# Patient Record
Sex: FEMALE | Race: WHITE | NOT HISPANIC OR LATINO | Employment: FULL TIME | URBAN - METROPOLITAN AREA
[De-identification: names, ages, dates, MRNs, and addresses within clinical notes are randomized per-mention and may not be internally consistent; named-entity substitution may affect disease eponyms.]

---

## 2023-03-15 LAB
ALBUMIN (G/DL) IN SER/PLAS: 4.5 G/DL (ref 3.4–5)
ANION GAP IN SER/PLAS: 12 MMOL/L (ref 10–20)
APPEARANCE, URINE: CLEAR
BASOPHILS (10*3/UL) IN BLOOD BY AUTOMATED COUNT: 0.04 X10E9/L (ref 0–0.1)
BASOPHILS/100 LEUKOCYTES IN BLOOD BY AUTOMATED COUNT: 0.9 % (ref 0–2)
BILIRUBIN, URINE: NEGATIVE
BLOOD, URINE: NEGATIVE
CALCIUM (MG/DL) IN SER/PLAS: 9.7 MG/DL (ref 8.6–10.6)
CARBON DIOXIDE, TOTAL (MMOL/L) IN SER/PLAS: 30 MMOL/L (ref 21–32)
CHLORIDE (MMOL/L) IN SER/PLAS: 103 MMOL/L (ref 98–107)
COLOR, URINE: ABNORMAL
CREATININE (MG/DL) IN SER/PLAS: 0.61 MG/DL (ref 0.5–1.05)
EOSINOPHILS (10*3/UL) IN BLOOD BY AUTOMATED COUNT: 0.14 X10E9/L (ref 0–0.7)
EOSINOPHILS/100 LEUKOCYTES IN BLOOD BY AUTOMATED COUNT: 3 % (ref 0–6)
ERYTHROCYTE DISTRIBUTION WIDTH (RATIO) BY AUTOMATED COUNT: 12.5 % (ref 11.5–14.5)
ERYTHROCYTE MEAN CORPUSCULAR HEMOGLOBIN CONCENTRATION (G/DL) BY AUTOMATED: 32.7 G/DL (ref 32–36)
ERYTHROCYTE MEAN CORPUSCULAR VOLUME (FL) BY AUTOMATED COUNT: 89 FL (ref 80–100)
ERYTHROCYTES (10*6/UL) IN BLOOD BY AUTOMATED COUNT: 4.26 X10E12/L (ref 4–5.2)
GFR FEMALE: >90 ML/MIN/1.73M2
GLUCOSE (MG/DL) IN SER/PLAS: 87 MG/DL (ref 74–99)
GLUCOSE, URINE: NEGATIVE MG/DL
HEMATOCRIT (%) IN BLOOD BY AUTOMATED COUNT: 37.9 % (ref 36–46)
HEMOGLOBIN (G/DL) IN BLOOD: 12.4 G/DL (ref 12–16)
IMMATURE GRANULOCYTES/100 LEUKOCYTES IN BLOOD BY AUTOMATED COUNT: 0.2 % (ref 0–0.9)
KETONES, URINE: NEGATIVE MG/DL
LEUKOCYTE ESTERASE, URINE: ABNORMAL
LEUKOCYTES (10*3/UL) IN BLOOD BY AUTOMATED COUNT: 4.6 X10E9/L (ref 4.4–11.3)
LIPASE (U/L) IN SER/PLAS: 22 U/L (ref 9–82)
LYMPHOCYTES (10*3/UL) IN BLOOD BY AUTOMATED COUNT: 2.08 X10E9/L (ref 1.2–4.8)
LYMPHOCYTES/100 LEUKOCYTES IN BLOOD BY AUTOMATED COUNT: 45.1 % (ref 13–44)
MONOCYTES (10*3/UL) IN BLOOD BY AUTOMATED COUNT: 0.45 X10E9/L (ref 0.1–1)
MONOCYTES/100 LEUKOCYTES IN BLOOD BY AUTOMATED COUNT: 9.8 % (ref 2–10)
NEUTROPHILS (10*3/UL) IN BLOOD BY AUTOMATED COUNT: 1.89 X10E9/L (ref 1.2–7.7)
NEUTROPHILS/100 LEUKOCYTES IN BLOOD BY AUTOMATED COUNT: 41 % (ref 40–80)
NITRITE, URINE: NEGATIVE
NRBC (PER 100 WBCS) BY AUTOMATED COUNT: 0 /100 WBC (ref 0–0)
PH, URINE: 8 (ref 5–8)
PHOSPHATE (MG/DL) IN SER/PLAS: 3.2 MG/DL (ref 2.5–4.9)
PLATELETS (10*3/UL) IN BLOOD AUTOMATED COUNT: 275 X10E9/L (ref 150–450)
POTASSIUM (MMOL/L) IN SER/PLAS: 3.4 MMOL/L (ref 3.5–5.3)
PROTEIN, URINE: NEGATIVE MG/DL
SODIUM (MMOL/L) IN SER/PLAS: 142 MMOL/L (ref 136–145)
SPECIFIC GRAVITY, URINE: 1.01 (ref 1–1.03)
UREA NITROGEN (MG/DL) IN SER/PLAS: 14 MG/DL (ref 6–23)
UROBILINOGEN, URINE: <2 MG/DL (ref 0–1.9)

## 2023-03-16 PROBLEM — I10 BENIGN ESSENTIAL HYPERTENSION: Status: ACTIVE | Noted: 2023-03-16

## 2023-03-16 PROBLEM — H52.00 HYPEROPIA: Status: ACTIVE | Noted: 2023-01-31

## 2023-03-16 PROBLEM — M19.079 1ST MTP ARTHRITIS: Status: ACTIVE | Noted: 2023-03-16
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
## 2023-03-16 PROBLEM — H52.209 ASTIGMATISM: Status: ACTIVE | Noted: 2023-01-31

## 2023-03-16 PROBLEM — E03.9 HYPOTHYROIDISM: Status: ACTIVE | Noted: 2023-03-16

## 2023-03-16 PROBLEM — M17.12 PRIMARY LOCALIZED OSTEOARTHROSIS OF LEFT LOWER LEG: Status: ACTIVE | Noted: 2023-03-16

## 2023-03-16 PROBLEM — H52.4 MYOPIA WITH PRESBYOPIA: Status: ACTIVE | Noted: 2023-01-31

## 2023-03-16 PROBLEM — H04.123 DRY EYES, BILATERAL: Status: ACTIVE | Noted: 2023-03-16

## 2023-03-16 PROBLEM — H52.4 MYOPIA WITH PRESBYOPIA: Status: ACTIVE | Noted: 2023-03-16

## 2023-03-16 PROBLEM — S93.491A SPRAIN OF ANTERIOR TALOFIBULAR LIGAMENT OF RIGHT ANKLE: Status: ACTIVE | Noted: 2023-03-16

## 2023-03-16 PROBLEM — M25.562 LEFT KNEE PAIN: Status: ACTIVE | Noted: 2023-03-16

## 2023-03-16 PROBLEM — R23.2 HOT FLASHES: Status: ACTIVE | Noted: 2023-03-16

## 2023-03-16 PROBLEM — H52.10 MYOPIA WITH PRESBYOPIA: Status: ACTIVE | Noted: 2023-01-31

## 2023-03-16 PROBLEM — M25.519 SHOULDER PAIN: Status: ACTIVE | Noted: 2023-03-16

## 2023-03-16 PROBLEM — S49.91XA INJURY OF RIGHT SHOULDER: Status: ACTIVE | Noted: 2023-03-16

## 2023-03-16 PROBLEM — S59.911A INJURY OF RIGHT FOREARM: Status: ACTIVE | Noted: 2023-03-16

## 2023-03-16 PROBLEM — E78.5 HYPERLIPIDEMIA: Status: ACTIVE | Noted: 2023-03-16

## 2023-03-16 PROBLEM — M79.672 LEFT FOOT PAIN: Status: ACTIVE | Noted: 2023-03-16

## 2023-03-16 PROBLEM — F32.A DEPRESSION: Status: ACTIVE | Noted: 2023-03-16

## 2023-03-16 PROBLEM — N20.0 KIDNEY STONES: Status: ACTIVE | Noted: 2023-03-16

## 2023-03-16 PROBLEM — H52.209 ASTIGMATISM: Status: ACTIVE | Noted: 2023-03-16

## 2023-03-16 PROBLEM — H52.10 MYOPIA WITH PRESBYOPIA: Status: ACTIVE | Noted: 2023-03-16

## 2023-03-16 PROBLEM — H25.813 COMBINED FORM OF AGE-RELATED CATARACT, BOTH EYES: Status: ACTIVE | Noted: 2023-01-31

## 2023-03-16 PROBLEM — M54.16 LUMBAR RADICULOPATHY: Status: ACTIVE | Noted: 2023-03-16

## 2023-03-16 PROBLEM — M25.571 RIGHT ANKLE PAIN: Status: ACTIVE | Noted: 2023-03-16

## 2023-03-16 PROBLEM — E66.3 OVERWEIGHT WITH BODY MASS INDEX (BMI) OF 29 TO 29.9 IN ADULT: Status: ACTIVE | Noted: 2023-03-16

## 2023-03-16 PROBLEM — S61.451A CAT BITE OF RIGHT HAND: Status: ACTIVE | Noted: 2023-03-16

## 2023-03-16 PROBLEM — H25.813 COMBINED FORM OF AGE-RELATED CATARACT, BOTH EYES: Status: ACTIVE | Noted: 2023-03-16

## 2023-03-16 PROBLEM — H04.123 DRY EYES, BILATERAL: Status: ACTIVE | Noted: 2023-01-31

## 2023-03-16 PROBLEM — R10.9 ABDOMINAL PAIN: Status: ACTIVE | Noted: 2023-03-16

## 2023-03-16 PROBLEM — H52.00 HYPEROPIA: Status: ACTIVE | Noted: 2023-03-16

## 2023-03-16 PROBLEM — N95.1 CLIMACTERIC: Status: ACTIVE | Noted: 2023-03-16

## 2023-03-16 PROBLEM — M79.10 MUSCLE ACHE: Status: ACTIVE | Noted: 2023-03-16

## 2023-03-16 PROBLEM — S50.11XA CONTUSION OF RIGHT FOREARM: Status: ACTIVE | Noted: 2023-03-16

## 2023-03-16 LAB
BACTERIA, URINE: ABNORMAL /HPF
RBC, URINE: ABNORMAL /HPF (ref 0–5)
SQUAMOUS EPITHELIAL CELLS, URINE: <1 /HPF
WBC, URINE: 1 /HPF (ref 0–5)

## 2023-03-16 RX ORDER — LEVOTHYROXINE SODIUM 112 UG/1
1 TABLET ORAL DAILY
COMMUNITY
Start: 2020-10-23 | End: 2023-09-11 | Stop reason: SDUPTHER

## 2023-03-16 RX ORDER — ACETAMINOPHEN 160 MG/5ML
1 SUSPENSION, ORAL (FINAL DOSE FORM) ORAL DAILY
COMMUNITY
Start: 2022-10-28 | End: 2024-03-21 | Stop reason: WASHOUT

## 2023-03-16 RX ORDER — HYDROCHLOROTHIAZIDE 12.5 MG/1
1 CAPSULE ORAL DAILY
COMMUNITY
Start: 2021-04-20 | End: 2024-01-11 | Stop reason: SDUPTHER

## 2023-03-16 RX ORDER — CITALOPRAM 20 MG/1
1 TABLET, FILM COATED ORAL DAILY
COMMUNITY
Start: 2020-10-14 | End: 2023-07-05 | Stop reason: DRUGHIGH

## 2023-03-16 RX ORDER — ATORVASTATIN CALCIUM 20 MG/1
1 TABLET, FILM COATED ORAL DAILY
COMMUNITY
Start: 2022-04-20 | End: 2023-05-30 | Stop reason: SDUPTHER

## 2023-03-20 ENCOUNTER — OFFICE VISIT (OUTPATIENT)
Dept: PRIMARY CARE | Facility: CLINIC | Age: 63
End: 2023-03-20
Payer: COMMERCIAL

## 2023-03-20 VITALS
RESPIRATION RATE: 18 BRPM | WEIGHT: 167.8 LBS | BODY MASS INDEX: 29.72 KG/M2 | OXYGEN SATURATION: 97 % | HEART RATE: 69 BPM

## 2023-03-20 DIAGNOSIS — R09.1 PLEURISY: Primary | ICD-10-CM

## 2023-03-20 PROCEDURE — 99214 OFFICE O/P EST MOD 30 MIN: CPT | Performed by: INTERNAL MEDICINE

## 2023-03-20 RX ORDER — METHYLPREDNISOLONE 4 MG/1
TABLET ORAL
Qty: 21 TABLET | Refills: 0 | Status: SHIPPED | OUTPATIENT
Start: 2023-03-20 | End: 2023-03-27

## 2023-03-20 ASSESSMENT — ENCOUNTER SYMPTOMS
DEPRESSION: 0
OCCASIONAL FEELINGS OF UNSTEADINESS: 0
LOSS OF SENSATION IN FEET: 0

## 2023-03-20 ASSESSMENT — PAIN SCALES - GENERAL: PAINLEVEL: 4

## 2023-04-15 NOTE — PROGRESS NOTES
Subjective   Patient ID: Mary Kate Short is a 62 y.o. female who presents for Pain (Left side ) and Dizziness.    HPI     Review of Systems    + Dizziness    Chest pain- ? pleuritic      No Fever/chills/headaches/ shortness of breath/palpitations/Nausea/vomiting/diarrhea/ constipation/urine frequency/blood in urine.      Objective   Pulse 69   Resp 18   Wt 76.1 kg (167 lb 12.8 oz)   SpO2 97%   BMI 29.72 kg/m²     Physical Exam    No JVP elevation. No palpable Lymph Nodes. No Thyromegaly    CVS-NL S1/S2 . No MRG    Lungs-CTA. B/S= B/L    Abdomen-Soft, Non-tender. No masses or HSM    Extremities: No C/C/E      Assessment/Plan       Dizziness    Pleuritic chest pain- ? Viral    Plan:    Fluids    NSAIDs PRN    Follow up PRN if symptoms persist or worsen

## 2023-05-30 DIAGNOSIS — F32.A DEPRESSION, UNSPECIFIED DEPRESSION TYPE: ICD-10-CM

## 2023-05-30 DIAGNOSIS — E03.9 HYPOTHYROIDISM, UNSPECIFIED TYPE: ICD-10-CM

## 2023-05-30 RX ORDER — LORAZEPAM 0.5 MG/1
0.5 TABLET ORAL 2 TIMES DAILY PRN
COMMUNITY
Start: 2023-03-20 | End: 2023-06-06 | Stop reason: SDUPTHER

## 2023-05-30 RX ORDER — LORAZEPAM 0.5 MG/1
0.5 TABLET ORAL 2 TIMES DAILY PRN
Qty: 60 TABLET | Refills: 2 | Status: CANCELLED | OUTPATIENT
Start: 2023-05-30 | End: 2023-08-28

## 2023-05-30 RX ORDER — ATORVASTATIN CALCIUM 20 MG/1
20 TABLET, FILM COATED ORAL DAILY
Qty: 30 TABLET | Refills: 0 | Status: SHIPPED | OUTPATIENT
Start: 2023-05-30 | End: 2023-07-12

## 2023-06-06 RX ORDER — LORAZEPAM 0.5 MG/1
0.5 TABLET ORAL 2 TIMES DAILY PRN
Qty: 60 TABLET | Refills: 0 | Status: SHIPPED | OUTPATIENT
Start: 2023-06-06 | End: 2024-02-02 | Stop reason: SDUPTHER

## 2023-07-01 DIAGNOSIS — E03.9 HYPOTHYROIDISM, UNSPECIFIED TYPE: ICD-10-CM

## 2023-07-05 ENCOUNTER — OFFICE VISIT (OUTPATIENT)
Dept: PRIMARY CARE | Facility: CLINIC | Age: 63
End: 2023-07-05
Payer: COMMERCIAL

## 2023-07-05 VITALS
WEIGHT: 165.6 LBS | SYSTOLIC BLOOD PRESSURE: 116 MMHG | DIASTOLIC BLOOD PRESSURE: 73 MMHG | RESPIRATION RATE: 18 BRPM | OXYGEN SATURATION: 98 % | HEART RATE: 65 BPM | BODY MASS INDEX: 29.33 KG/M2

## 2023-07-05 DIAGNOSIS — Z79.899 CONTROLLED SUBSTANCE AGREEMENT SIGNED: ICD-10-CM
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
## 2023-07-05 DIAGNOSIS — E03.9 HYPOTHYROIDISM, UNSPECIFIED TYPE: ICD-10-CM

## 2023-07-05 DIAGNOSIS — F41.9 ANXIETY: Primary | ICD-10-CM

## 2023-07-05 LAB
AMPHETAMINE (PRESENCE) IN URINE BY SCREEN METHOD: NORMAL
BARBITURATES PRESENCE IN URINE BY SCREEN METHOD: NORMAL
BENZODIAZEPINE (PRESENCE) IN URINE BY SCREEN METHOD: NORMAL
CANNABINOIDS IN URINE BY SCREEN METHOD: NORMAL
COCAINE (PRESENCE) IN URINE BY SCREEN METHOD: NORMAL
DRUG SCREEN COMMENT URINE: NORMAL
FENTANYL URINE: NORMAL
METHADONE (PRESENCE) IN URINE BY SCREEN METHOD: NORMAL
OPIATES (PRESENCE) IN URINE BY SCREEN METHOD: NORMAL
OXYCODONE (PRESENCE) IN URINE BY SCREEN METHOD: NORMAL
PHENCYCLIDINE (PRESENCE) IN URINE BY SCREEN METHOD: NORMAL

## 2023-07-05 PROCEDURE — 99214 OFFICE O/P EST MOD 30 MIN: CPT | Performed by: INTERNAL MEDICINE

## 2023-07-05 PROCEDURE — 80307 DRUG TEST PRSMV CHEM ANLYZR: CPT

## 2023-07-05 PROCEDURE — 3078F DIAST BP <80 MM HG: CPT | Performed by: INTERNAL MEDICINE

## 2023-07-05 PROCEDURE — 3074F SYST BP LT 130 MM HG: CPT | Performed by: INTERNAL MEDICINE

## 2023-07-05 RX ORDER — CITALOPRAM 20 MG/1
TABLET, FILM COATED ORAL
Qty: 60 TABLET | Refills: 3 | Status: SHIPPED | OUTPATIENT
Start: 2023-07-05 | End: 2024-02-16 | Stop reason: SDUPTHER

## 2023-07-05 NOTE — PROGRESS NOTES
Subjective   Patient ID: Mary Kate Short is a 62 y.o. female who presents for Follow-up.    HPI     Falls=9O  OA    Tearful    Tired    Hypothyroid  Review of Systems      No Fever/chills/headaches/dizziness/chest pains/ shortness of breath/palpitations/Nausea/vomiting/diarrhea/ constipation/urine frequency/blood in urine.      Objective   /73 (BP Location: Left arm, Patient Position: Sitting, BP Cuff Size: Adult)   Pulse 65   Resp 18   Wt 75.1 kg (165 lb 9.6 oz)   SpO2 98%   BMI 29.33 kg/m²     Physical Exam    No JVP elevation. No palpable Lymph Nodes. No Thyromegaly    CVS-NL S1/S2 . No MRG    Lungs-CTA. B/S= B/L    Abdomen-Soft, Non-tender. No masses or HSM    Extremities: No C/C/E      Assessment/Plan     Falls    OA    Tearful    Tired    Hypothyroid    Anxiety/Dysthymia- Increase Citalopram from 20-30 mg daily     Follow up CBT    PT    Derm Follow up     Follow up 3-6 months/PRN

## 2023-07-12 RX ORDER — ATORVASTATIN CALCIUM 20 MG/1
TABLET, FILM COATED ORAL
Qty: 90 TABLET | Refills: 3 | Status: SHIPPED | OUTPATIENT
Start: 2023-07-12 | End: 2024-07-11

## 2023-09-11 ENCOUNTER — OFFICE VISIT (OUTPATIENT)
Dept: PRIMARY CARE | Facility: CLINIC | Age: 63
End: 2023-09-11
Payer: MEDICARE

## 2023-09-11 VITALS
OXYGEN SATURATION: 97 % | WEIGHT: 166.4 LBS | BODY MASS INDEX: 29.48 KG/M2 | RESPIRATION RATE: 18 BRPM | HEART RATE: 71 BPM

## 2023-09-11 DIAGNOSIS — R00.9 HEART BEAT ABNORMALITY: ICD-10-CM

## 2023-09-11 DIAGNOSIS — E78.5 HYPERLIPIDEMIA, UNSPECIFIED HYPERLIPIDEMIA TYPE: ICD-10-CM

## 2023-09-11 DIAGNOSIS — E03.9 HYPOTHYROIDISM, UNSPECIFIED TYPE: Primary | ICD-10-CM

## 2023-09-11 DIAGNOSIS — M25.569 KNEE PAIN, UNSPECIFIED CHRONICITY, UNSPECIFIED LATERALITY: ICD-10-CM

## 2023-09-11 DIAGNOSIS — H52.00 HYPERMETROPIA, UNSPECIFIED LATERALITY: ICD-10-CM

## 2023-09-11 PROCEDURE — 1125F AMNT PAIN NOTED PAIN PRSNT: CPT | Performed by: INTERNAL MEDICINE

## 2023-09-11 PROCEDURE — 99214 OFFICE O/P EST MOD 30 MIN: CPT | Performed by: INTERNAL MEDICINE

## 2023-09-11 PROCEDURE — 1160F RVW MEDS BY RX/DR IN RCRD: CPT | Performed by: INTERNAL MEDICINE

## 2023-09-11 PROCEDURE — 93000 ELECTROCARDIOGRAM COMPLETE: CPT | Performed by: INTERNAL MEDICINE

## 2023-09-11 PROCEDURE — 1159F MED LIST DOCD IN RCRD: CPT | Performed by: INTERNAL MEDICINE

## 2023-09-11 RX ORDER — LEVOTHYROXINE SODIUM 112 UG/1
112 TABLET ORAL DAILY
Qty: 90 TABLET | Refills: 3 | Status: SHIPPED | OUTPATIENT
Start: 2023-09-11 | End: 2024-09-10

## 2023-09-11 NOTE — PROGRESS NOTES
Subjective   Patient ID: Mary Kate Short is a 63 y.o. female who presents for Follow-up (8 wks).    HPI     ATS-Improved with Citalopram 30 mg daily ( 20+ 10)    Hypothyroid    Knee arthralgia        Review of Systems      No Fever/chills/headaches/dizziness/chest pains/ shortness of breath/palpitations/Nausea/vomiting/diarrhea/ constipation/urine frequency/blood in urine.    Objective   Pulse 71   Resp 18   Wt 75.5 kg (166 lb 6.4 oz)   SpO2 97%   BMI 29.48 kg/m²     Physical Exam    No JVP elevation. No palpable Lymph Nodes. No Thyromegaly    CVS-NL S1/S2 . No MRG    Lungs-CTA. B/S= B/L    Abdomen-Soft, Non-tender. No masses or HSM    Extremities: No C/C/E     Assessment/Plan       ATS-Improved with Citalopram 30 mg daily ( 20+ 10)    Hypothyroid    Knee arthralgia    EKG-( SSRI-R/O QT prolongation)    Continue with current Rx    Follow up/ Call with any concerns    Follow up in 6 months/PRN

## 2023-11-18 ENCOUNTER — OFFICE VISIT (OUTPATIENT)
Dept: DERMATOLOGY | Facility: CLINIC | Age: 63
End: 2023-11-18
Payer: MEDICARE

## 2023-11-18 DIAGNOSIS — D48.5 NEOPLASM OF UNCERTAIN BEHAVIOR OF SKIN: Primary | ICD-10-CM

## 2023-11-18 DIAGNOSIS — L30.9 HAND DERMATITIS: ICD-10-CM

## 2023-11-18 PROCEDURE — 1160F RVW MEDS BY RX/DR IN RCRD: CPT | Performed by: NURSE PRACTITIONER

## 2023-11-18 PROCEDURE — 1159F MED LIST DOCD IN RCRD: CPT | Performed by: NURSE PRACTITIONER

## 2023-11-18 PROCEDURE — 99203 OFFICE O/P NEW LOW 30 MIN: CPT | Performed by: NURSE PRACTITIONER

## 2023-11-18 PROCEDURE — 1036F TOBACCO NON-USER: CPT | Performed by: NURSE PRACTITIONER

## 2023-11-18 PROCEDURE — 1125F AMNT PAIN NOTED PAIN PRSNT: CPT | Performed by: NURSE PRACTITIONER

## 2023-11-18 PROCEDURE — 11102 TANGNTL BX SKIN SINGLE LES: CPT | Performed by: NURSE PRACTITIONER

## 2023-11-18 PROCEDURE — 88305 TISSUE EXAM BY PATHOLOGIST: CPT | Mod: TC,DER | Performed by: NURSE PRACTITIONER

## 2023-11-18 PROCEDURE — 88305 TISSUE EXAM BY PATHOLOGIST: CPT | Performed by: DERMATOLOGY

## 2023-11-18 RX ORDER — CLOBETASOL PROPIONATE 0.5 MG/G
CREAM TOPICAL
Qty: 60 G | Refills: 3 | Status: SHIPPED | OUTPATIENT
Start: 2023-11-18 | End: 2024-03-21 | Stop reason: WASHOUT

## 2023-11-18 NOTE — PROGRESS NOTES
Subjective     Mary Kate Short is a 63 y.o. female who presents for the following: Suspicious Skin Lesion (Patient presents to office today for examination of single lesion to: left supraclavicular neck./Lesion onset: months ago./Patient denies pain, bleeding, itching to affected area./Prior treatments to affected area: none./Patient denies further complaints.  ).     Review of Systems:  No other skin or systemic complaints other than what is documented elsewhere in the note.    The following portions of the chart were reviewed this encounter and updated as appropriate:         Skin Cancer History  No skin cancer on file.      Specialty Problems          Dermatology Problems    Cat bite of right hand    Contusion of right forearm        Objective   Well appearing patient in no apparent distress; mood and affect are within normal limits.    A full examination was performed including scalp, head, eyes, ears, nose, lips, neck, chest, axillae, abdomen, back, buttocks, bilateral upper extremities, bilateral lower extremities, hands, feet, fingers, toes, fingernails, and toenails. All findings within normal limits unless otherwise noted below.    Assessment/Plan   1. Neoplasm of uncertain behavior of skin  Right Breast  3mm ulcer with surrounding erythema          Lesion biopsy  Type of biopsy: tangential    Informed consent: discussed and consent obtained    Timeout: patient name, date of birth, surgical site, and procedure verified    Procedure prep:  Patient was prepped and draped  Anesthesia: the lesion was anesthetized in a standard fashion    Anesthetic:  1% lidocaine w/ epinephrine 1-100,000 local infiltration  Instrument used: DermaBlade    Hemostasis achieved with: aluminum chloride    Outcome: patient tolerated procedure well    Post-procedure details: sterile dressing applied and wound care instructions given    Dressing type: petrolatum and bandage      Staff Communication: Dermatology Local Anesthesia: 1 %  Lidocaine / Epinephrine - Amount:    Specimen 1 - Dermatopathology- DERM LAB  Differential Diagnosis: r/o nmsc  Check Margins Yes/No?:    Comments:    Dermpath Lab: Routine Histopathology (formalin-fixed tissue)    -  Discussed differential with patient.   - Given uncertainty of clinical diagnosis, a biopsy is recommended in clinic today.   - The patient expressed understanding, is in agreement with this plan, and wishes to proceed with biopsy.   - Oral and written wound care instructions provided.   - Advised the patient that the office will call within 2 weeks to discuss biopsy results.     2. Hand dermatitis  Left Hand - Anterior, Right Hand - Anterior  Erythematous, fissured patches with scale.     Hand dermatitis  - This can be caused by irritants, allergies, frequent water exposure, or skin disease. Hand dermatitis may cause itching, swelling, or blisters. Without treatment, the skin may become thick, cracked, or scaly, and bleed.  - Most of the time, hand dermatitis is caused by coming into contact with something that irritates the skin, such as chemicals, or contact with something that causes allergic reactions in some people, such as latex gloves or poison ivy. Work that requires keeping your hands wet can also irritate the skin of your palms.  - Avoid the environmental trigger that is causing the reaction. Wear gloves for work that involves chemicals or submerging your hands in water.  Plan  - Regular use of moisturizer or petroleum jelly, especially after bathing and hand washing, can reduce irritation.  - Wash hands in cold water, if possible.    - Start clobetasol cream, use as directed.   -  Risks, benefits, side effects, alternatives and options were discussed with patient and the patient voiced understanding.      Related Medications  clobetasol (Temovate) 0.05 % cream  Twice daily for 2-3 weeks, then weekends only. Repeat every few months for flares.

## 2023-11-22 LAB
LABORATORY COMMENT REPORT: NORMAL
PATH REPORT.FINAL DX SPEC: NORMAL
PATH REPORT.GROSS SPEC: NORMAL
PATH REPORT.MICROSCOPIC SPEC OTHER STN: NORMAL
PATH REPORT.RELEVANT HX SPEC: NORMAL
PATH REPORT.TOTAL CANCER: NORMAL

## 2023-12-19 ENCOUNTER — HOSPITAL ENCOUNTER (OUTPATIENT)
Dept: RADIOLOGY | Facility: HOSPITAL | Age: 63
Discharge: HOME | End: 2023-12-19
Payer: MEDICARE

## 2023-12-19 DIAGNOSIS — Z12.31 SCREENING MAMMOGRAM FOR BREAST CANCER: ICD-10-CM

## 2023-12-19 PROCEDURE — 77063 BREAST TOMOSYNTHESIS BI: CPT | Performed by: RADIOLOGY

## 2023-12-19 PROCEDURE — 77067 SCR MAMMO BI INCL CAD: CPT | Performed by: RADIOLOGY

## 2023-12-19 PROCEDURE — 77067 SCR MAMMO BI INCL CAD: CPT

## 2024-01-11 DIAGNOSIS — I10 BENIGN ESSENTIAL HYPERTENSION: Primary | ICD-10-CM

## 2024-01-11 RX ORDER — HYDROCHLOROTHIAZIDE 12.5 MG/1
12.5 CAPSULE ORAL DAILY
Qty: 90 CAPSULE | Refills: 1 | Status: SHIPPED | OUTPATIENT
Start: 2024-01-11 | End: 2024-07-09

## 2024-02-02 DIAGNOSIS — F32.A DEPRESSION, UNSPECIFIED DEPRESSION TYPE: Primary | ICD-10-CM

## 2024-02-05 RX ORDER — LORAZEPAM 0.5 MG/1
0.5 TABLET ORAL 2 TIMES DAILY PRN
Qty: 60 TABLET | Refills: 0 | Status: SHIPPED | OUTPATIENT
Start: 2024-02-05 | End: 2024-03-06

## 2024-02-16 ENCOUNTER — HOSPITAL ENCOUNTER (OUTPATIENT)
Dept: RADIOLOGY | Facility: HOSPITAL | Age: 64
Discharge: HOME | End: 2024-02-16

## 2024-02-16 DIAGNOSIS — F41.9 ANXIETY: ICD-10-CM

## 2024-02-16 DIAGNOSIS — Z86.11 HISTORY OF TREATMENT FOR TUBERCULOSIS: ICD-10-CM

## 2024-02-16 PROCEDURE — 71046 X-RAY EXAM CHEST 2 VIEWS: CPT

## 2024-02-16 RX ORDER — CITALOPRAM 20 MG/1
TABLET, FILM COATED ORAL
Qty: 60 TABLET | Refills: 0 | Status: SHIPPED | OUTPATIENT
Start: 2024-02-16 | End: 2024-03-22

## 2024-03-18 DIAGNOSIS — F41.9 ANXIETY: ICD-10-CM

## 2024-03-21 ENCOUNTER — OFFICE VISIT (OUTPATIENT)
Dept: DERMATOLOGY | Facility: CLINIC | Age: 64
End: 2024-03-21
Payer: MEDICARE

## 2024-03-21 DIAGNOSIS — L81.4 LENTIGO: ICD-10-CM

## 2024-03-21 DIAGNOSIS — L82.1 SEBORRHEIC KERATOSIS: ICD-10-CM

## 2024-03-21 DIAGNOSIS — L30.9 HAND DERMATITIS: ICD-10-CM

## 2024-03-21 DIAGNOSIS — L72.11 PILAR CYST: ICD-10-CM

## 2024-03-21 DIAGNOSIS — L82.0 INFLAMED SEBORRHEIC KERATOSIS: ICD-10-CM

## 2024-03-21 DIAGNOSIS — D22.9 MULTIPLE BENIGN NEVI: Primary | ICD-10-CM

## 2024-03-21 DIAGNOSIS — L85.3 XEROSIS CUTIS: ICD-10-CM

## 2024-03-21 DIAGNOSIS — Z12.83 SCREENING EXAM FOR SKIN CANCER: ICD-10-CM

## 2024-03-21 PROCEDURE — 1159F MED LIST DOCD IN RCRD: CPT | Performed by: DERMATOLOGY

## 2024-03-21 PROCEDURE — 1036F TOBACCO NON-USER: CPT | Performed by: DERMATOLOGY

## 2024-03-21 PROCEDURE — 99214 OFFICE O/P EST MOD 30 MIN: CPT | Performed by: DERMATOLOGY

## 2024-03-21 PROCEDURE — 1160F RVW MEDS BY RX/DR IN RCRD: CPT | Performed by: DERMATOLOGY

## 2024-03-21 RX ORDER — TRIAMCINOLONE ACETONIDE 1 MG/G
CREAM TOPICAL 2 TIMES DAILY
Qty: 80 G | Refills: 3 | Status: SHIPPED | OUTPATIENT
Start: 2024-03-21

## 2024-03-21 ASSESSMENT — DERMATOLOGY QUALITY OF LIFE (QOL) ASSESSMENT
RATE HOW BOTHERED YOU ARE BY SYMPTOMS OF YOUR SKIN PROBLEM (EG, ITCHING, STINGING BURNING, HURTING OR SKIN IRRITATION): 0 - NEVER BOTHERED
DATE THE QUALITY-OF-LIFE ASSESSMENT WAS COMPLETED: 66920
RATE HOW BOTHERED YOU ARE BY EFFECTS OF YOUR SKIN PROBLEMS ON YOUR ACTIVITIES (EG, GOING OUT, ACCOMPLISHING WHAT YOU WANT, WORK ACTIVITIES OR YOUR RELATIONSHIPS WITH OTHERS): 0 - NEVER BOTHERED
RATE HOW EMOTIONALLY BOTHERED YOU ARE BY YOUR SKIN PROBLEM (FOR EXAMPLE, WORRY, EMBARRASSMENT, FRUSTRATION): 0 - NEVER BOTHERED
ARE THERE EXCLUSIONS OR EXCEPTIONS FOR THE QUALITY OF LIFE ASSESSMENT: NO

## 2024-03-21 ASSESSMENT — ITCH NUMERIC RATING SCALE: HOW SEVERE IS YOUR ITCHING?: 0

## 2024-03-21 ASSESSMENT — DERMATOLOGY PATIENT ASSESSMENT
ARE YOU ON BIRTH CONTROL: NO
DO YOU USE A TANNING BED: NO
DO YOU HAVE ANY NEW OR CHANGING LESIONS: NO
ARE YOU TRYING TO GET PREGNANT: NO
HAVE YOU HAD OR DO YOU HAVE VASCULAR DISEASE: NO
HAVE YOU HAD OR DO YOU HAVE A STAPH INFECTION: NO
DO YOU USE SUNSCREEN: OCCASIONALLY
DO YOU HAVE IRREGULAR MENSTRUAL CYCLES: NO
ARE YOU AN ORGAN TRANSPLANT RECIPIENT: NO

## 2024-03-21 ASSESSMENT — PATIENT GLOBAL ASSESSMENT (PGA): PATIENT GLOBAL ASSESSMENT: PATIENT GLOBAL ASSESSMENT:  1 - CLEAR

## 2024-03-21 NOTE — PROGRESS NOTES
Subjective     Mary Kate Short is a 63 y.o. female who presents for the following: Skin Check.     She has never had melanoma herself but her father did.    Last derm visit with Sue Mayne, skin biopsy    SKIN, RIGHT BREAST, SHAVE BIOPSY:  ACANTHOSIS WITH LICHENOID DERMATITIS, CONSISTENT WITH BENIGN LICHENOID KERATOSIS.  (SEE COMMENT):        Review of Systems:  No other skin or systemic complaints other than what is documented elsewhere in the note.    The following portions of the chart were reviewed this encounter and updated as appropriate:  Tobacco  Allergies  Meds  Problems  Med Hx  Surg Hx  Fam Hx         Skin Cancer History  No skin cancer on file.      Specialty Problems          Dermatology Problems    Cat bite of right hand    Contusion of right forearm        Objective   Well appearing patient in no apparent distress; mood and affect are within normal limits.    A full examination was performed including scalp, head, eyes, ears, nose, lips, neck, chest, axillae, abdomen, back, buttocks, bilateral upper extremities, bilateral lower extremities, hands, feet, fingers, toes, fingernails, and toenails. All findings within normal limits unless otherwise noted below.    Assessment/Plan   1. Multiple benign nevi  Brown and tan macules and papules with reassuring findings on dermoscopy    -These lesions have benign, reassuring patterns on dermoscopy  -Recommend continued self observation, and to contact the office if any changes in nevi are noticed    2. Lentigo  Tan macules    -Benign appearing on exam  -Reassurance, recommend observation    3. Seborrheic keratosis  Stuck on, waxy macule(s)/papule(s)/plaque(s) with comedo-like openings and milia like cysts    -Discussed the nature of the diagnosis  -Reassurance, recommend continued observation    4. Screening exam for skin cancer    Full body skin exam  -No lesions concerning for malignancy on the remainder the skin exam today   - The ugly duckling sign  was discussed. Monitor for any skin lesions that are different in color, shape, or size than others on body  -Sun protection was discussed. Recommend SPF 30+, hats with brims, sun protective clothing, and avoiding sun exposure between 10 AM and 2 PM whenever possible  -Recommend regular skin exams or sooner if new or changing lesions       Related Procedures  Follow Up In Dermatology - Established Patient    5. Pilar cyst  Mid Parietal Scalp  1.5 cm mobile subcutaneous nodule    - Discussed benign nature and that no treatment is necessary unless it becomes painful or increases in size. Patient opts for clinical monitoring at this time.    -monitor    6. Xerosis cutis  Dry skin    -Discussed nature of condition  -Discussed gentle skin care habits including using gentle soap such as Dove or Cetaphil to cleanse the skin.   -Recommend to avoid harsh cleansers/soaps such as: Dial, Lever 2000, [place].  -Recommend to use emollients twice daily, once immediately after the shower while the skin is still damp.   -Recommended emollients include: Aveeno Eczema Therapy or Daily Moisturizer, La Roche Posay Lipikar AP Balm, or plain Vaseline.   -Recommend to avoid hot water/showers; lukewarm or cool water/showers will be beneficial.         7. Inflamed seborrheic keratosis (2)  Left Breast, Right Upper Back  Pink lichenoid papules with surrounding inflammation    - she had one biopsied from right breast 11/2023  - rx triamcinolone to help with itching  - reassured    8. Hand dermatitis  Right Hand - Posterior  Fissuring pink thin plaque mostly on right thumb    Present for years  Clobetasol was too expesnive, did not get    triamcinolone (Kenalog) 0.1 % cream - Right Hand - Posterior  Apply topically 2 times a day. To areas of eczema and itchy spots on body for up to 2 weeks    Related Medications  clobetasol (Temovate) 0.05 % cream  Twice daily for 2-3 weeks, then weekends only. Repeat every few months for  flares.        Follow up 1 year Full Skin Exam

## 2024-03-21 NOTE — PATIENT INSTRUCTIONS
Dry Skin    Dry skin can occur at any age and for many reasons. In general, skin becomes drier as we age. It is drier in the winter months than in summer months, and drier in low-humidity climates than in high humidity climates. Skin looks and feels dry because it lacks water. However, your skin’s natural oils are necessary to prevent it from drying out. Our goal is to replace the oil in order to keep the water in your skin.    In some people, areas of seriously dry skin can lead to a condition called dermatitis in which the skin becomes inflamed. When dermatitis is present, your dermatologist may prescribe a corticosteroid cream or ointment. This cream is applied to the affected areas only. Discontinue the corticosteroid cream when the dermatitis clears. The use of a moisturizing lotion, cream, or ointment should be continued to help prevent recurrence of the dermatitis.    Avoid hot baths and showers -- hot water removes your natural skin oils more quickly.  Keep showers or bath brief (5-10 minutes).  Use a mild soap or cleanser (bar or liquid body wash):  Dove      Cetaphil   Vanicream  Aveeno  Oil of Olay     If you have eczema or sensitive skin, look for formulas that are for “sensitive skin” or “fragrance-free.” “Unscented” products may still contain perfumes.  If your skin is extra-dry, you may only need to use soap daily to the underarms and groin. Use soap to the whole body only 2-3 times weekly.  When you get out of the bath or shower, pat skin dry.  Use the “3-Minute Rule” after you pat yourself dry: apply a moisturizer within 3 minutes of getting out of the bath.  Good moisturizers:  CeraVe    Aveeno    Petroleum jelly (Vaseline)  Cetaphil    Vegetable oil    Aquaphor  Nutraderm    Olive oil    Mineral oil  Eucerin   Neutrogena Norwegian Formula    *Note:  In general, creams are better moisturizers than lotions.    You may need to reapply moisturizers 2-4 times a day.

## 2024-03-22 RX ORDER — CITALOPRAM 20 MG/1
TABLET, FILM COATED ORAL
Qty: 180 TABLET | Refills: 1 | Status: SHIPPED | OUTPATIENT
Start: 2024-03-22

## 2024-06-13 ENCOUNTER — HOSPITAL ENCOUNTER (OUTPATIENT)
Dept: RADIOLOGY | Facility: HOSPITAL | Age: 64
Discharge: HOME | End: 2024-06-13
Payer: MEDICARE

## 2024-06-13 DIAGNOSIS — R52 PAIN: ICD-10-CM

## 2024-06-13 PROCEDURE — 73590 X-RAY EXAM OF LOWER LEG: CPT | Mod: LT

## 2024-06-13 PROCEDURE — 73610 X-RAY EXAM OF ANKLE: CPT | Mod: LT

## 2024-08-01 DIAGNOSIS — E03.9 HYPOTHYROIDISM, UNSPECIFIED TYPE: ICD-10-CM

## 2024-08-01 DIAGNOSIS — I10 BENIGN ESSENTIAL HYPERTENSION: ICD-10-CM

## 2024-08-01 RX ORDER — HYDROCHLOROTHIAZIDE 12.5 MG/1
12.5 CAPSULE ORAL DAILY
Qty: 90 CAPSULE | Refills: 3 | Status: SHIPPED | OUTPATIENT
Start: 2024-08-01 | End: 2025-08-01

## 2024-08-01 RX ORDER — ATORVASTATIN CALCIUM 20 MG/1
20 TABLET, FILM COATED ORAL DAILY
Qty: 90 TABLET | Refills: 3 | Status: SHIPPED | OUTPATIENT
Start: 2024-08-01 | End: 2025-08-01

## 2024-09-11 ENCOUNTER — OFFICE VISIT (OUTPATIENT)
Dept: URGENT CARE | Age: 64
End: 2024-09-11
Payer: MEDICARE

## 2024-09-11 VITALS
OXYGEN SATURATION: 97 % | RESPIRATION RATE: 16 BRPM | WEIGHT: 155 LBS | BODY MASS INDEX: 27.46 KG/M2 | SYSTOLIC BLOOD PRESSURE: 148 MMHG | HEART RATE: 56 BPM | TEMPERATURE: 97.9 F | DIASTOLIC BLOOD PRESSURE: 86 MMHG

## 2024-09-11 DIAGNOSIS — S90.122A CONTUSION OF FIFTH TOE OF LEFT FOOT, INITIAL ENCOUNTER: Primary | ICD-10-CM

## 2024-09-11 ASSESSMENT — ENCOUNTER SYMPTOMS
MUSCULOSKELETAL NEGATIVE: 1
CONSTITUTIONAL NEGATIVE: 1

## 2024-09-11 ASSESSMENT — PAIN SCALES - GENERAL: PAINLEVEL: 8

## 2024-09-11 NOTE — PROGRESS NOTES
Subjective   Patient ID: Mary Kate Short is a 64 y.o. female. They present today with a chief complaint of Injury (Patient injured left foot 1.5 weeks ago [facility]).    History of Present Illness    Injury      Past Medical History  Allergies as of 09/11/2024 - Reviewed 09/11/2024   Allergen Reaction Noted    Penicillins Rash 03/16/2023       (Not in a hospital admission)       History reviewed. No pertinent past medical history.    Past Surgical History:   Procedure Laterality Date    OTHER SURGICAL HISTORY  10/14/2020    Oophorectomy bilateral    OTHER SURGICAL HISTORY  10/14/2020    Hysterectomy    OTHER SURGICAL HISTORY  10/14/2020    Appendectomy        reports that she has never smoked. She has never used smokeless tobacco. She reports current alcohol use of about 1.0 standard drink of alcohol per week. She reports that she does not use drugs.    Review of Systems  Review of Systems   Constitutional: Negative.    Musculoskeletal: Negative.                                   Objective    Vitals:    09/11/24 1734   BP: 148/86   Pulse: 56   Resp: 16   Temp: 36.6 °C (97.9 °F)   SpO2: 97%   Weight: 70.3 kg (155 lb)     No LMP recorded (lmp unknown). Patient is postmenopausal.    Physical Exam  Musculoskeletal:        Legs:       Comments: Tenderness and swelling   Neurological:      Mental Status: She is alert.         Procedures    Point of Care Test & Imaging Results from this visit  Results for orders placed or performed in visit on 11/18/23   Dermatopathology- DERM LAB   Result Value Ref Range    Case Report       Dermatopathology                                  Case: [id number]                                   Authorizing Provider:  Susan L Mayne, APRN-CNP    Collected:           11/18/2023 1207              Ordering Location:     [facility]       Received:            11/18/2023 1238              Pathologist:           Angelica Conte MD                                                              Specimen:    SKIN SHAVE BIOPSY, Right Breast                                                            FINAL DIAGNOSIS       SKIN, RIGHT BREAST, SHAVE BIOPSY:  ACANTHOSIS WITH LICHENOID DERMATITIS, CONSISTENT WITH BENIGN LICHENOID KERATOSIS.  (SEE COMMENT):    Comment:  Mild keratinocyte atypia is identified and favored to be reactive.      **Electronically signed out by MEDHAT COON MD**                By the signature on this report, the individual or group listed as making the Final Interpretation/Diagnosis certifies that they have reviewed this case.       Clinical History       Encounter Diagnosis: Neoplasm of uncertain behavior of skin       [id number]  Collection Comments: Differential Diagnosis: r/o nmsc  Check Margins Yes/No?:    Comments:    Dermpath Lab: Routine Histopathology (formalin-fixed tissue)  Finding Region: Right Breast  Specimen Objective: 3mm ulcer with surrounding erythema      Microscopic Description       Microscopic examination was performed.        Gross Description       Received in formalin is a 9 x 7 x 2 mm piece of skin.  It is tan and brown in color.  It is shave in shape.  It was embedded in toto.  The specimen was inked.    The specimen was grossed by Sherri Denney.             No results found.    Diagnostic study results (if any) were reviewed by [facility].    Assessment/Plan   Allergies, medications, history, and pertinent labs/EKGs/Imaging reviewed by Vonnie Wu MD.     Medical Decision Making      Orders and Diagnoses  Diagnoses and all orders for this visit:  Contusion of fifth toe of left foot, initial encounter      Medical Admin Record      Follow Up Instructions  No follow-ups on file.    Patient disposition: Home    Electronically signed by [facility]  6:35 PM

## 2024-09-11 NOTE — PATIENT INSTRUCTIONS
Please Body tape toes and apply Coban, wear the post op shoes and when you got your X rays, call back [facility]

## 2024-09-12 ENCOUNTER — HOSPITAL ENCOUNTER (OUTPATIENT)
Dept: RADIOLOGY | Facility: HOSPITAL | Age: 64
Discharge: HOME | End: 2024-09-12
Payer: MEDICARE

## 2024-09-12 ENCOUNTER — PATIENT MESSAGE (OUTPATIENT)
Dept: PRIMARY CARE | Facility: CLINIC | Age: 64
End: 2024-09-12
Payer: COMMERCIAL

## 2024-09-12 DIAGNOSIS — S90.122A CONTUSION OF FIFTH TOE OF LEFT FOOT, INITIAL ENCOUNTER: ICD-10-CM

## 2024-09-12 PROCEDURE — 73660 X-RAY EXAM OF TOE(S): CPT | Mod: LEFT SIDE | Performed by: STUDENT IN AN ORGANIZED HEALTH CARE EDUCATION/TRAINING PROGRAM

## 2024-09-12 PROCEDURE — 73660 X-RAY EXAM OF TOE(S): CPT | Mod: LT

## 2024-09-13 ENCOUNTER — OFFICE VISIT (OUTPATIENT)
Dept: ORTHOPEDIC SURGERY | Facility: HOSPITAL | Age: 64
End: 2024-09-13
Payer: MEDICARE

## 2024-09-13 DIAGNOSIS — S92.515A NONDISPLACED FRACTURE OF PROXIMAL PHALANX OF LEFT LESSER TOE(S), INITIAL ENCOUNTER FOR CLOSED FRACTURE: ICD-10-CM

## 2024-09-13 PROCEDURE — L4361 PNEUMA/VAC WALK BOOT PRE OTS: HCPCS

## 2024-09-13 PROCEDURE — 99214 OFFICE O/P EST MOD 30 MIN: CPT

## 2024-09-13 PROCEDURE — 1159F MED LIST DOCD IN RCRD: CPT

## 2024-09-15 ENCOUNTER — DOCUMENTATION (OUTPATIENT)
Dept: URGENT CARE | Age: 64
End: 2024-09-15

## 2024-09-16 ENCOUNTER — TELEPHONE (OUTPATIENT)
Dept: PRIMARY CARE | Facility: CLINIC | Age: 64
End: 2024-09-16
Payer: COMMERCIAL

## 2024-09-16 DIAGNOSIS — Z92.89 HISTORY OF COMPLETE BLOOD COUNT: Primary | ICD-10-CM

## 2024-09-16 DIAGNOSIS — Z00.00 ROUTINE GENERAL MEDICAL EXAMINATION AT A HEALTH CARE FACILITY: ICD-10-CM

## 2024-09-16 DIAGNOSIS — E03.9 HYPOTHYROIDISM, UNSPECIFIED TYPE: ICD-10-CM

## 2024-09-16 DIAGNOSIS — E78.5 HYPERLIPIDEMIA, UNSPECIFIED HYPERLIPIDEMIA TYPE: ICD-10-CM

## 2024-09-18 NOTE — PROGRESS NOTES
HPI:  Mary Kate Short is a 64-year-old female who presents today with a 1 week history of left pinky toe pain.  She stubbed her left little toe about 1 weeks ago and xrays confirmed fracture.  She has pain over the dorsum and lateral aspect of the foot near the fifth digit.  Her pain is minimal at rest and mainly with walking.  She denies numbness and tingling.  She has been icing and resting her foot as tolerated.  She has questions about a postop shoe versus a boot.  She states she is following up with Dr. Lim on 9/19/2024.    ROS:  Reviewed on EMR and patient intake sheet.    PMH/SH:  Reviewed on EMR and patient intake sheet.    Exam:  MSK: Mild swelling and erythema over the PIP of the fifth digit on the left foot on visual examination.  Mild tenderness palpation over the PIP joint.  Able to flex toes passively and actively.  Neurovascularly intact.  General: No acute distress. Awake and conversant.  Eyes: Normal conjunctiva, anicteric. Round symmetric pupils.  ENT: Hearing grossly intact. No nasal discharge.  Neck: Neck is supple. No masses or thyromegaly.  Respiratory: Respirations are non-labored. No wheezing.  Skin: Warm. No rashes or ulcers.  Psych: Alert and oriented. Cooperative, appropriate mood and affect, normal judgement.  CV: No lower extremity edema.  Neuro: Sensation and CN II-XII grossly normal.    Radiology:     STUDY:  XR TOE LEFT 2+ VIEWS;  9/12/2024 9:46 am      INDICATION:  Signs/Symptoms:pain after stubbed 5th toe.      ,S90.122A Contusion of left lesser toe(s) without damage to nail,  initial encounter      COMPARISON:  None.      ACCESSION NUMBER(S):  [id number]      ORDERING CLINICIAN:  RYAN GARCIA      FINDINGS:  Three views of the distal left toe were provided      There is an nondisplaced intra-articular fracture of the medial 5th  proximal phalanx with adjacent soft tissue swelling.      No other acute fracture or dislocation is visualized.      IMPRESSION:  Nondisplaced  intra-articular fracture of the medial 5th proximal  phalanx with adjacent soft tissue swelling.      I personally reviewed the images/study and I agree with the findings  as stated by Yordy Lorenzo DO PGY-2. This study was interpreted at  [facility], [place], [place].    Diagnosis:    Left PIP fracture, fifth digit    Assessment and Plan:  Patient has been seen and evaluated for a left fifth digit nondisplaced fracture of the left foot.  At this time, we discussed a postop shoe versus a walking boot.  The patient states that she would be more comfortable and compliant in a walking boot.  I discussed with the patient that this is not necessary, but respected her request.  She was recommended to ambulate and weight-bear as tolerated.  She may use over-the-counter anti-inflammatories and Tylenol for the pain.  She may remove the boot when not walking, and she may elevate and ice the foot for pain and swelling.  She should keep her follow-up appoint with Dr. Lim for repeat x-rays.  Patient feels all questions were answered today.  Patient agrees to plan above.    Walt Ledesma PA-C  Department of Orthopaedic Surgery  3:43 PM  09/18/24    [id number] [place] [address]    Voicemail: [phone]   Appts: [phone]  Fax: [phone]

## 2024-09-19 ENCOUNTER — HOSPITAL ENCOUNTER (OUTPATIENT)
Dept: RADIOLOGY | Facility: CLINIC | Age: 64
Discharge: HOME | End: 2024-09-19
Payer: MEDICARE

## 2024-09-19 ENCOUNTER — OFFICE VISIT (OUTPATIENT)
Dept: ORTHOPEDIC SURGERY | Facility: CLINIC | Age: 64
End: 2024-09-19
Payer: MEDICARE

## 2024-09-19 VITALS — WEIGHT: 155 LBS | HEIGHT: 64 IN | BODY MASS INDEX: 26.46 KG/M2

## 2024-09-19 DIAGNOSIS — M17.11 PRIMARY OSTEOARTHRITIS OF RIGHT KNEE: Primary | ICD-10-CM

## 2024-09-19 DIAGNOSIS — M15.0 PRIMARY OSTEOARTHRITIS INVOLVING MULTIPLE JOINTS: ICD-10-CM

## 2024-09-19 DIAGNOSIS — M15.9 PRIMARY OSTEOARTHRITIS INVOLVING MULTIPLE JOINTS: ICD-10-CM

## 2024-09-19 PROCEDURE — 99213 OFFICE O/P EST LOW 20 MIN: CPT | Performed by: ORTHOPAEDIC SURGERY

## 2024-09-19 PROCEDURE — 73560 X-RAY EXAM OF KNEE 1 OR 2: CPT | Mod: LT

## 2024-09-19 PROCEDURE — 3008F BODY MASS INDEX DOCD: CPT | Performed by: ORTHOPAEDIC SURGERY

## 2024-09-19 PROCEDURE — 1123F ACP DISCUSS/DSCN MKR DOCD: CPT | Performed by: ORTHOPAEDIC SURGERY

## 2024-09-19 PROCEDURE — 73562 X-RAY EXAM OF KNEE 3: CPT | Mod: RT

## 2024-09-19 ASSESSMENT — PAIN - FUNCTIONAL ASSESSMENT: PAIN_FUNCTIONAL_ASSESSMENT: 0-10

## 2024-09-19 ASSESSMENT — PAIN SCALES - GENERAL: PAINLEVEL_OUTOF10: 5 - MODERATE PAIN

## 2024-09-19 ASSESSMENT — PAIN DESCRIPTION - DESCRIPTORS: DESCRIPTORS: ACHING

## 2024-09-26 ENCOUNTER — LAB (OUTPATIENT)
Dept: LAB | Facility: LAB | Age: 64
End: 2024-09-26
Payer: COMMERCIAL

## 2024-09-26 DIAGNOSIS — E78.5 HYPERLIPIDEMIA, UNSPECIFIED HYPERLIPIDEMIA TYPE: ICD-10-CM

## 2024-09-26 DIAGNOSIS — Z92.89 HISTORY OF COMPLETE BLOOD COUNT: ICD-10-CM

## 2024-09-26 DIAGNOSIS — E03.9 HYPOTHYROIDISM, UNSPECIFIED TYPE: ICD-10-CM

## 2024-09-26 DIAGNOSIS — Z00.00 ROUTINE GENERAL MEDICAL EXAMINATION AT A HEALTH CARE FACILITY: ICD-10-CM

## 2024-09-26 LAB
ALBUMIN SERPL BCP-MCNC: 4.6 G/DL (ref 3.4–5)
ALP SERPL-CCNC: 83 U/L (ref 33–136)
ALT SERPL W P-5'-P-CCNC: 16 U/L (ref 7–45)
ANION GAP SERPL CALC-SCNC: 14 MMOL/L (ref 10–20)
APPEARANCE UR: CLEAR
AST SERPL W P-5'-P-CCNC: 13 U/L (ref 9–39)
BASOPHILS # BLD AUTO: 0.06 X10*3/UL (ref 0–0.1)
BASOPHILS NFR BLD AUTO: 0.8 %
BILIRUB SERPL-MCNC: 0.8 MG/DL (ref 0–1.2)
BILIRUB UR STRIP.AUTO-MCNC: NEGATIVE MG/DL
BUN SERPL-MCNC: 19 MG/DL (ref 6–23)
CALCIUM SERPL-MCNC: 9.7 MG/DL (ref 8.6–10.6)
CHLORIDE SERPL-SCNC: 101 MMOL/L (ref 98–107)
CHOLEST SERPL-MCNC: 209 MG/DL (ref 0–199)
CHOLESTEROL/HDL RATIO: 3.3
CO2 SERPL-SCNC: 30 MMOL/L (ref 21–32)
COLOR UR: ABNORMAL
CREAT SERPL-MCNC: 0.65 MG/DL (ref 0.5–1.05)
EGFRCR SERPLBLD CKD-EPI 2021: >90 ML/MIN/1.73M*2
EOSINOPHIL # BLD AUTO: 0.19 X10*3/UL (ref 0–0.7)
EOSINOPHIL NFR BLD AUTO: 2.6 %
ERYTHROCYTE [DISTWIDTH] IN BLOOD BY AUTOMATED COUNT: 13.2 % (ref 11.5–14.5)
GLUCOSE SERPL-MCNC: 87 MG/DL (ref 74–99)
GLUCOSE UR STRIP.AUTO-MCNC: NORMAL MG/DL
HCT VFR BLD AUTO: 40.3 % (ref 36–46)
HDLC SERPL-MCNC: 62.6 MG/DL
HGB BLD-MCNC: 12.9 G/DL (ref 12–16)
IMM GRANULOCYTES # BLD AUTO: 0.08 X10*3/UL (ref 0–0.7)
IMM GRANULOCYTES NFR BLD AUTO: 1.1 % (ref 0–0.9)
KETONES UR STRIP.AUTO-MCNC: NEGATIVE MG/DL
LDLC SERPL CALC-MCNC: 133 MG/DL
LEUKOCYTE ESTERASE UR QL STRIP.AUTO: ABNORMAL
LYMPHOCYTES # BLD AUTO: 1.97 X10*3/UL (ref 1.2–4.8)
LYMPHOCYTES NFR BLD AUTO: 26.7 %
MCH RBC QN AUTO: 28.6 PG (ref 26–34)
MCHC RBC AUTO-ENTMCNC: 32 G/DL (ref 32–36)
MCV RBC AUTO: 89 FL (ref 80–100)
MONOCYTES # BLD AUTO: 0.7 X10*3/UL (ref 0.1–1)
MONOCYTES NFR BLD AUTO: 9.5 %
MUCOUS THREADS #/AREA URNS AUTO: NORMAL /LPF
NEUTROPHILS # BLD AUTO: 4.39 X10*3/UL (ref 1.2–7.7)
NEUTROPHILS NFR BLD AUTO: 59.3 %
NITRITE UR QL STRIP.AUTO: NEGATIVE
NON HDL CHOLESTEROL: 146 MG/DL (ref 0–149)
NRBC BLD-RTO: 0 /100 WBCS (ref 0–0)
PH UR STRIP.AUTO: 6.5 [PH]
PLATELET # BLD AUTO: 311 X10*3/UL (ref 150–450)
POTASSIUM SERPL-SCNC: 4 MMOL/L (ref 3.5–5.3)
PROT SERPL-MCNC: 6.9 G/DL (ref 6.4–8.2)
PROT UR STRIP.AUTO-MCNC: NEGATIVE MG/DL
RBC # BLD AUTO: 4.51 X10*6/UL (ref 4–5.2)
RBC # UR STRIP.AUTO: NEGATIVE /UL
RBC #/AREA URNS AUTO: NORMAL /HPF
SODIUM SERPL-SCNC: 141 MMOL/L (ref 136–145)
SP GR UR STRIP.AUTO: 1.01
SQUAMOUS #/AREA URNS AUTO: NORMAL /HPF
TRIGL SERPL-MCNC: 66 MG/DL (ref 0–149)
TSH SERPL-ACNC: 0.87 MIU/L (ref 0.44–3.98)
UROBILINOGEN UR STRIP.AUTO-MCNC: NORMAL MG/DL
VLDL: 13 MG/DL (ref 0–40)
WBC # BLD AUTO: 7.4 X10*3/UL (ref 4.4–11.3)
WBC #/AREA URNS AUTO: NORMAL /HPF

## 2024-09-26 PROCEDURE — 85025 COMPLETE CBC W/AUTO DIFF WBC: CPT

## 2024-09-26 PROCEDURE — 80053 COMPREHEN METABOLIC PANEL: CPT

## 2024-09-26 PROCEDURE — 81001 URINALYSIS AUTO W/SCOPE: CPT

## 2024-09-26 PROCEDURE — 80061 LIPID PANEL: CPT

## 2024-09-26 PROCEDURE — 84443 ASSAY THYROID STIM HORMONE: CPT

## 2024-09-27 ENCOUNTER — APPOINTMENT (OUTPATIENT)
Dept: PRIMARY CARE | Facility: CLINIC | Age: 64
End: 2024-09-27
Payer: COMMERCIAL

## 2024-09-27 VITALS
RESPIRATION RATE: 19 BRPM | HEART RATE: 70 BPM | OXYGEN SATURATION: 96 % | DIASTOLIC BLOOD PRESSURE: 76 MMHG | WEIGHT: 167 LBS | HEIGHT: 64 IN | BODY MASS INDEX: 28.51 KG/M2 | SYSTOLIC BLOOD PRESSURE: 128 MMHG

## 2024-09-27 DIAGNOSIS — I10 BENIGN ESSENTIAL HYPERTENSION: ICD-10-CM

## 2024-09-27 DIAGNOSIS — Z12.31 SCREENING MAMMOGRAM FOR BREAST CANCER: ICD-10-CM

## 2024-09-27 DIAGNOSIS — G47.30 SLEEP APNEA, UNSPECIFIED TYPE: ICD-10-CM

## 2024-09-27 DIAGNOSIS — Z00.00 MEDICARE ANNUAL WELLNESS VISIT, SUBSEQUENT: Primary | ICD-10-CM

## 2024-09-27 DIAGNOSIS — E03.9 HYPOTHYROIDISM, UNSPECIFIED TYPE: ICD-10-CM

## 2024-09-27 DIAGNOSIS — F41.9 ANXIETY: ICD-10-CM

## 2024-09-27 DIAGNOSIS — E78.5 HYPERLIPIDEMIA, UNSPECIFIED HYPERLIPIDEMIA TYPE: ICD-10-CM
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
## 2024-09-27 PROBLEM — S61.451A CAT BITE OF RIGHT HAND: Status: RESOLVED | Noted: 2023-03-16 | Resolved: 2024-09-27

## 2024-09-27 PROCEDURE — 1159F MED LIST DOCD IN RCRD: CPT

## 2024-09-27 PROCEDURE — 3078F DIAST BP <80 MM HG: CPT

## 2024-09-27 PROCEDURE — 1036F TOBACCO NON-USER: CPT

## 2024-09-27 PROCEDURE — 3008F BODY MASS INDEX DOCD: CPT

## 2024-09-27 PROCEDURE — 1160F RVW MEDS BY RX/DR IN RCRD: CPT

## 2024-09-27 PROCEDURE — 1158F ADVNC CARE PLAN TLK DOCD: CPT

## 2024-09-27 PROCEDURE — 3074F SYST BP LT 130 MM HG: CPT

## 2024-09-27 PROCEDURE — 1123F ACP DISCUSS/DSCN MKR DOCD: CPT

## 2024-09-27 PROCEDURE — 1170F FXNL STATUS ASSESSED: CPT

## 2024-09-27 PROCEDURE — G0439 PPPS, SUBSEQ VISIT: HCPCS

## 2024-09-27 RX ORDER — CITALOPRAM 20 MG/1
TABLET, FILM COATED ORAL
Qty: 180 TABLET | Refills: 1 | Status: SHIPPED | OUTPATIENT
Start: 2024-09-27

## 2024-09-27 ASSESSMENT — ACTIVITIES OF DAILY LIVING (ADL)
BATHING: INDEPENDENT
MANAGING_FINANCES: INDEPENDENT
DRESSING: INDEPENDENT
GROCERY_SHOPPING: INDEPENDENT
TAKING_MEDICATION: INDEPENDENT
DOING_HOUSEWORK: INDEPENDENT

## 2024-09-27 ASSESSMENT — PATIENT HEALTH QUESTIONNAIRE - PHQ9
1. LITTLE INTEREST OR PLEASURE IN DOING THINGS: NOT AT ALL
2. FEELING DOWN, DEPRESSED OR HOPELESS: NOT AT ALL
SUM OF ALL RESPONSES TO PHQ9 QUESTIONS 1 AND 2: 0

## 2024-09-27 ASSESSMENT — ENCOUNTER SYMPTOMS
LOSS OF SENSATION IN FEET: 0
OCCASIONAL FEELINGS OF UNSTEADINESS: 0
DEPRESSION: 0

## 2024-09-27 NOTE — PROGRESS NOTES
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    Mammogram last 12/19/2023- due 12/2024    Hysterectomy about 8-9 years ago    Colonoscopy- 6/30/21- 7 year repeat- due 6/30/2028    Healthcare POA- Socorro Limon- spouse    Immunizations- she already received her influenza vaccine through her work; declines Shingles vaccine    Sleep apnea- Feels tired when she wakes up in the morning ; periods of apnea at night; wakes up gasping sometimes    Active Problem List  Patient Active Problem List   Diagnosis    1st MTP arthritis    Benign essential hypertension    Climacteric    Contusion of right forearm    Depression    Hot flashes    Hyperlipidemia    Hypothyroidism    Injury of right forearm    Injury of right shoulder    Kidney stones    Left foot pain    Left knee pain    Lumbar radiculopathy    Muscle ache    Right ankle pain    Shoulder pain    Sprain of anterior talofibular ligament of right ankle    Primary localized osteoarthrosis of left lower leg    Abdominal pain    Astigmatism    Combined form of age-related cataract, both eyes    Dry eyes, bilateral    Hyperopia    Myopia with presbyopia    Overweight with body mass index (BMI) of 29 to 29.9 in adult    Medicare annual wellness visit, subsequent       Comprehensive Medical/Surgical/Social/Family History  Past Medical History:   Diagnosis Date    Cat bite 09/27/2024     Past Surgical History:   Procedure Laterality Date    OTHER SURGICAL HISTORY  10/14/2020    Oophorectomy bilateral    OTHER SURGICAL HISTORY  10/14/2020    Hysterectomy    OTHER SURGICAL HISTORY  10/14/2020    Appendectomy     Social  History     Tobacco Use    Smoking status: Never    Smokeless tobacco: Never   Substance Use Topics    Alcohol use: Yes     Alcohol/week: 1.0 standard drink of alcohol     Types: 1 Glasses of wine per week    Drug use: Never     Family History   Problem Relation Name Age of Onset    Other (Malignant melanoma) Father      Other (CVA) Other Multiple members     Heart disease Other Multiple members          Allergies and Medications  Penicillins  Current Outpatient Medications on File Prior to Visit   Medication Sig Dispense Refill    atorvastatin (Lipitor) 20 mg tablet Take 1 tablet (20 mg) by mouth once daily. 90 tablet 3    citalopram (CeleXA) 20 mg tablet TAKE 2 TABLETS BY MOUTH DAILY X 60 TABS 180 tablet 1    hydroCHLOROthiazide (Microzide) 12.5 mg capsule Take 1 capsule (12.5 mg) by mouth once daily. 90 capsule 3    levothyroxine (Synthroid, Levoxyl) 112 mcg tablet Take 1 tablet (112 mcg) by mouth once daily. 90 tablet 3    triamcinolone (Kenalog) 0.1 % cream Apply topically 2 times a day. To areas of eczema and itchy spots on body for up to 2 weeks 80 g 3    LORazepam (Ativan) 0.5 mg tablet Take 1 tablet (0.5 mg) by mouth 2 times a day as needed for anxiety. 60 tablet 0     No current facility-administered medications on file prior to visit.       Medications and Supplements  prescribed by me and other practitioners or clinical pharmacist (such as prescriptions, OTC's, herbal therapies and supplements) were reviewed and documented in the medical record.    Tobacco/Alcohol/Opioid use, as well as Illicit Drug Use was screened for/reviewed and documented in Social History section and medication list as appropriate    Advance directives  Advanced Care Planning (including a Living Will, Healthcare POA, as well as specific end of life choices and/or directives), was discussed with the patient and/or surrogate, voluntarily, and documented in the medical record.     ROS otherwise negative aside from what was mentioned  "above in HPI.    Vitals  /76   Pulse 70   Resp 19   Ht 1.626 m (5' 4\")   Wt 75.8 kg (167 lb)   LMP  (LMP Unknown)   SpO2 96%   BMI 28.67 kg/m²   Body mass index is 28.67 kg/m².  Physical Exam  Gen: Alert, NAD  HEENT:  PERRLA, EOMI, conjunctiva and sclera normal in appearance. External auditory canals/TMs normal; Oral cavity and posterior pharynx without lesions/exudate  Neck:  Supple with FROM; No masses/nodes palpable; Thyroid nontender and without nodules; No RAMON  Respiratory:  Lungs CTAB  Cardiovascular:  Heart RRR. No M/R/G. Peripheral pulses equal bilaterally  Abdomen:  Soft, nontender, BS present throughout; No R/G/R; No HSM or masses palpated  Extremities:  FROM all extremities; Muscle strength grossly normal with good tone  Neuro:  CN II-XII intact; Reflexes 2+/2+; Gross motor and sensory intact  Skin:  No suspicious lesions present      Assessment and Plan:  Problem List Items Addressed This Visit    NPV/ est care  MWE   Benign essential hypertension    Stable  C/w hydrochlorothiazide   Relevant Orders    CBC and Auto Differential    Hyperlipidemia    Stable; LDL above goal  C/w atorvastatin 20mg; she had myalgias with higher dose; work on lifestyle changes  Relevant Orders    CBC and Auto Differential    Hypothyroidism    Stable  C/w synthroid  Relevant Orders    CBC and Auto Differential    Medicare annual wellness visit, subsequent - Primary    Relevant Orders    CBC and Auto Differential     Sleep Apnea  -home sleep study    Depression & anxiety   -stable; c/w Celexa  -lorazepam 1/2 tablet every few months  -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 Completed preventive screenings were documented in the chart and orders were placed for outstanding screenings/procedures as documented in the Assessment and Plan.      Patient Instructions (the written plan) was given to the patient at check out.    Follow up in 1 year for annual wellness exam and as needed    DONG Funk-MADAN

## 2024-09-30 ENCOUNTER — APPOINTMENT (OUTPATIENT)
Dept: PRIMARY CARE | Facility: CLINIC | Age: 64
End: 2024-09-30
Payer: MEDICARE

## 2024-10-02 PROBLEM — M17.11 PRIMARY OSTEOARTHRITIS OF RIGHT KNEE: Status: ACTIVE | Noted: 2024-10-02

## 2024-10-02 NOTE — PROGRESS NOTES
Patient is a 64-year-old female who presents today for evaluation of bilateral knee pain.  Her right bothers her more than the left.  She has known underlying osteoarthritis.  She has had previous cortisone injections.  She is taking ibuprofen.  She rates her pain at times as 7 out of 10.  She has difficulty walking sort of distance going downstairs.    Right knee:  AAOx3, NAD, walks with a moderate antalgic gait  valgus allignment  Range of motion lacks 5 degrees of full extension and flexes to 115 degrees  Stable to varus/valgus/anterior/posterior stress through out the range of motion  Slight laxity with valgus stress  Diffuse lateral joint line tenderness to palpation  Moderate effusion  SILT in a june/saph/per/tib distribution  5/5 knee extension/df/pf/ehl  ½ dorsalis pedis and posterior tibial pulse  no popliteal lymphadenopathy  no other overlying lesions  mood: euthymic  Respirations non labored    Plain films were reviewed by myself in clinic today.  She has advanced osteoarthritis of her right knee with significant joint space narrowing, underlying sclerosis and tricompartmental osteophytic change.    We discussed further conservative treatment versus surgery with her today in clinic.  Ultimately she would benefit from total knee replacement.  She was given my office card and number can call to schedule surgery if she decides to proceed.  All of her questions were answered.  Would see her back ahead of time for a full surgical consult.    M17.11  [id number]  Attune  Overnight  [facility] [place]    This note was created using voice recognition software and was not corrected for typographical or grammatical errors.

## 2024-10-08 ENCOUNTER — PROCEDURE VISIT (OUTPATIENT)
Dept: SLEEP MEDICINE | Facility: HOSPITAL | Age: 64
End: 2024-10-08
Payer: MEDICARE

## 2024-10-08 ENCOUNTER — APPOINTMENT (OUTPATIENT)
Dept: ORTHOPEDIC SURGERY | Facility: CLINIC | Age: 64
End: 2024-10-08
Payer: MEDICARE

## 2024-10-08 DIAGNOSIS — G47.30 SLEEP APNEA, UNSPECIFIED TYPE: ICD-10-CM

## 2024-10-08 PROCEDURE — 95806 SLEEP STUDY UNATT&RESP EFFT: CPT | Performed by: STUDENT IN AN ORGANIZED HEALTH CARE EDUCATION/TRAINING PROGRAM

## 2024-10-11 ENCOUNTER — APPOINTMENT (OUTPATIENT)
Dept: ORTHOPEDIC SURGERY | Facility: CLINIC | Age: 64
End: 2024-10-11
Payer: COMMERCIAL

## 2024-10-23 DIAGNOSIS — E03.9 HYPOTHYROIDISM, UNSPECIFIED TYPE: ICD-10-CM

## 2024-10-23 RX ORDER — LEVOTHYROXINE SODIUM 112 UG/1
112 TABLET ORAL DAILY
Qty: 90 TABLET | Refills: 3 | Status: SHIPPED | OUTPATIENT
Start: 2024-10-23 | End: 2025-10-23

## 2024-11-18 ENCOUNTER — TELEPHONE (OUTPATIENT)
Dept: PRIMARY CARE | Facility: CLINIC | Age: 64
End: 2024-11-18
Payer: COMMERCIAL

## 2024-11-18 NOTE — TELEPHONE ENCOUNTER
Per Leisa Claudio, APRN-CNP... Informed Patient Mild to moderate sleep apnea. Since you also are very symptomatic, I would suggest moving forward with CPAP machine at night in addition to lifestyle changes. The alternative would be lifestyle changes and seeing someone in sleep medicine for alternative treatments. Lifestyle changes would include: avoiding alcohol before bed and weight loss.

## 2024-12-19 ENCOUNTER — HOSPITAL ENCOUNTER (OUTPATIENT)
Dept: RADIOLOGY | Facility: HOSPITAL | Age: 64
Discharge: HOME | End: 2024-12-19
Payer: MEDICARE

## 2024-12-19 VITALS — WEIGHT: 160 LBS | BODY MASS INDEX: 27.31 KG/M2 | HEIGHT: 64 IN

## 2024-12-19 PROCEDURE — 77063 BREAST TOMOSYNTHESIS BI: CPT

## 2024-12-20 ENCOUNTER — LAB (OUTPATIENT)
Dept: LAB | Facility: LAB | Age: 64
End: 2024-12-20
Payer: MEDICARE

## 2024-12-20 ENCOUNTER — OFFICE VISIT (OUTPATIENT)
Dept: PRIMARY CARE | Facility: CLINIC | Age: 64
End: 2024-12-20
Payer: MEDICARE

## 2024-12-20 VITALS
HEART RATE: 65 BPM | BODY MASS INDEX: 27.66 KG/M2 | HEIGHT: 64 IN | SYSTOLIC BLOOD PRESSURE: 138 MMHG | DIASTOLIC BLOOD PRESSURE: 80 MMHG | WEIGHT: 162 LBS | OXYGEN SATURATION: 97 %

## 2024-12-20 DIAGNOSIS — M54.16 LUMBAR RADICULOPATHY: ICD-10-CM

## 2024-12-20 DIAGNOSIS — R10.12 LEFT UPPER QUADRANT ABDOMINAL PAIN: ICD-10-CM

## 2024-12-20 DIAGNOSIS — R31.21 ASYMPTOMATIC MICROSCOPIC HEMATURIA: ICD-10-CM

## 2024-12-20 DIAGNOSIS — E78.5 HYPERLIPIDEMIA, UNSPECIFIED HYPERLIPIDEMIA TYPE: ICD-10-CM

## 2024-12-20 DIAGNOSIS — R11.0 NAUSEA: Primary | ICD-10-CM

## 2024-12-20 DIAGNOSIS — E03.9 HYPOTHYROIDISM, UNSPECIFIED TYPE: ICD-10-CM

## 2024-12-20 DIAGNOSIS — R14.2 BELCHING: ICD-10-CM

## 2024-12-20 DIAGNOSIS — R11.0 NAUSEA: ICD-10-CM

## 2024-12-20 DIAGNOSIS — I10 BENIGN ESSENTIAL HYPERTENSION: ICD-10-CM

## 2024-12-20 DIAGNOSIS — Z00.00 MEDICARE ANNUAL WELLNESS VISIT, SUBSEQUENT: ICD-10-CM

## 2024-12-20 LAB
ALBUMIN SERPL BCP-MCNC: 4.2 G/DL (ref 3.4–5)
ALP SERPL-CCNC: 92 U/L (ref 33–136)
ALT SERPL W P-5'-P-CCNC: 37 U/L (ref 7–45)
ANION GAP SERPL CALC-SCNC: 10 MMOL/L (ref 10–20)
APPEARANCE UR: CLEAR
AST SERPL W P-5'-P-CCNC: 35 U/L (ref 9–39)
BASOPHILS # BLD AUTO: 0.06 X10*3/UL (ref 0–0.1)
BASOPHILS NFR BLD AUTO: 0.9 %
BILIRUB SERPL-MCNC: 0.5 MG/DL (ref 0–1.2)
BILIRUB UR STRIP.AUTO-MCNC: NEGATIVE MG/DL
BUN SERPL-MCNC: 16 MG/DL (ref 6–23)
CALCIUM SERPL-MCNC: 9.6 MG/DL (ref 8.6–10.3)
CHLORIDE SERPL-SCNC: 102 MMOL/L (ref 98–107)
CO2 SERPL-SCNC: 31 MMOL/L (ref 21–32)
COLOR UR: NORMAL
CREAT SERPL-MCNC: 0.68 MG/DL (ref 0.5–1.05)
EGFRCR SERPLBLD CKD-EPI 2021: >90 ML/MIN/1.73M*2
EOSINOPHIL # BLD AUTO: 0.26 X10*3/UL (ref 0–0.7)
EOSINOPHIL NFR BLD AUTO: 4 %
ERYTHROCYTE [DISTWIDTH] IN BLOOD BY AUTOMATED COUNT: 13.2 % (ref 11.5–14.5)
GLUCOSE SERPL-MCNC: 94 MG/DL (ref 74–99)
GLUCOSE UR STRIP.AUTO-MCNC: NORMAL MG/DL
HCT VFR BLD AUTO: 37.7 % (ref 36–46)
HGB BLD-MCNC: 12.2 G/DL (ref 12–16)
IMM GRANULOCYTES # BLD AUTO: 0.01 X10*3/UL (ref 0–0.7)
IMM GRANULOCYTES NFR BLD AUTO: 0.2 % (ref 0–0.9)
KETONES UR STRIP.AUTO-MCNC: NEGATIVE MG/DL
LEUKOCYTE ESTERASE UR QL STRIP.AUTO: NEGATIVE
LYMPHOCYTES # BLD AUTO: 2.37 X10*3/UL (ref 1.2–4.8)
LYMPHOCYTES NFR BLD AUTO: 36.2 %
MCH RBC QN AUTO: 28.7 PG (ref 26–34)
MCHC RBC AUTO-ENTMCNC: 32.4 G/DL (ref 32–36)
MCV RBC AUTO: 89 FL (ref 80–100)
MONOCYTES # BLD AUTO: 0.56 X10*3/UL (ref 0.1–1)
MONOCYTES NFR BLD AUTO: 8.6 %
NEUTROPHILS # BLD AUTO: 3.28 X10*3/UL (ref 1.2–7.7)
NEUTROPHILS NFR BLD AUTO: 50.1 %
NITRITE UR QL STRIP.AUTO: NEGATIVE
NRBC BLD-RTO: 0 /100 WBCS (ref 0–0)
PH UR STRIP.AUTO: 6 [PH]
PLATELET # BLD AUTO: 275 X10*3/UL (ref 150–450)
POTASSIUM SERPL-SCNC: 3.7 MMOL/L (ref 3.5–5.3)
PROT SERPL-MCNC: 6.8 G/DL (ref 6.4–8.2)
PROT UR STRIP.AUTO-MCNC: NEGATIVE MG/DL
RBC # BLD AUTO: 4.25 X10*6/UL (ref 4–5.2)
RBC # UR STRIP.AUTO: NEGATIVE /UL
SODIUM SERPL-SCNC: 139 MMOL/L (ref 136–145)
SP GR UR STRIP.AUTO: 1.01
UROBILINOGEN UR STRIP.AUTO-MCNC: NORMAL MG/DL
WBC # BLD AUTO: 6.5 X10*3/UL (ref 4.4–11.3)

## 2024-12-20 PROCEDURE — 80053 COMPREHEN METABOLIC PANEL: CPT

## 2024-12-20 PROCEDURE — 1160F RVW MEDS BY RX/DR IN RCRD: CPT

## 2024-12-20 PROCEDURE — 36415 COLL VENOUS BLD VENIPUNCTURE: CPT

## 2024-12-20 PROCEDURE — 99213 OFFICE O/P EST LOW 20 MIN: CPT

## 2024-12-20 PROCEDURE — 3075F SYST BP GE 130 - 139MM HG: CPT

## 2024-12-20 PROCEDURE — 1123F ACP DISCUSS/DSCN MKR DOCD: CPT

## 2024-12-20 PROCEDURE — 1159F MED LIST DOCD IN RCRD: CPT

## 2024-12-20 PROCEDURE — 85025 COMPLETE CBC W/AUTO DIFF WBC: CPT

## 2024-12-20 PROCEDURE — 81003 URINALYSIS AUTO W/O SCOPE: CPT

## 2024-12-20 PROCEDURE — 3008F BODY MASS INDEX DOCD: CPT

## 2024-12-20 PROCEDURE — 3079F DIAST BP 80-89 MM HG: CPT

## 2024-12-20 RX ORDER — FAMOTIDINE 20 MG/1
20 TABLET, FILM COATED ORAL 2 TIMES DAILY
Qty: 60 TABLET | Refills: 0 | Status: SHIPPED | OUTPATIENT
Start: 2024-12-20 | End: 2025-01-19

## 2024-12-20 NOTE — PROGRESS NOTES
"Primary Care Provider: Leisa Claudio, APRN-CNP    Subjective   Mary Kate Short is a 64 y.o. female who presents for Follow-up.    HPI     Was in [place] early November and developed 24hr acute N/V/D; right before she had this she noticed some blood in her urine    Left sided abdominal pain- feels like a spasm;    Slight nausea and excessive belching now  BL achy dull pain back pain- worse when laying flat; mild pain 2 to 3 out of 10   Bowels have been regular with Metamucil  history of kidney stones  No numbness, no tingling, no weakness  No trouble with Bowel or bladder function    Asymptomatic microscopic hematuria    Hypertension    Review of Systems  The remainder of the ROS was negative unless otherwise stated in the HPI.       Objective   /80   Pulse 65   Ht 1.626 m (5' 4\")   Wt 73.5 kg (162 lb)   LMP  (LMP Unknown)   SpO2 97%   BMI 27.81 kg/m²     Physical Exam  Vitals reviewed.   Constitutional:       General: She is not in acute distress.     Appearance: Normal appearance. She is normal weight. She is not ill-appearing, toxic-appearing or diaphoretic.   HENT:      Head: Normocephalic and atraumatic.      Nose: Nose normal.   Eyes:      Conjunctiva/sclera: Conjunctivae normal.   Cardiovascular:      Rate and Rhythm: Normal rate and regular rhythm.      Pulses: Normal pulses.      Heart sounds: Normal heart sounds. No murmur heard.     No friction rub. No gallop.   Pulmonary:      Effort: Pulmonary effort is normal. No respiratory distress.      Breath sounds: Normal breath sounds.   Abdominal:      General: Abdomen is flat. Bowel sounds are normal.      Palpations: Abdomen is soft.   Musculoskeletal:         General: Normal range of motion.      Cervical back: Normal range of motion and neck supple.   Lymphadenopathy:      Cervical: No cervical adenopathy.   Skin:     General: Skin is warm and dry.      Capillary Refill: Capillary refill takes less than 2 seconds.   Neurological:      " General: No focal deficit present.      Mental Status: She is alert and oriented to person, place, and time. Mental status is at baseline.   Psychiatric:         Mood and Affect: Mood normal.         Behavior: Behavior normal.         Thought Content: Thought content normal.         Judgment: Judgment normal.         Assessment/Plan   Problem List Items Addressed This Visit             ICD-10-CM    Benign essential hypertension I10    Mildly elevated today  Continue with hydrochlorothiazide  Low-sodium diet  Relevant Orders    CBC and Auto Differential    Comprehensive metabolic panel (Completed)    Urinalysis with Reflex Microscopic (Completed)    Lumbar radiculopathy M54.16    Stable  Relevant Orders    CBC and Auto Differential    Comprehensive metabolic panel (Completed)    Urinalysis with Reflex Microscopic (Completed)    Abdominal pain R10.9    New  Relevant Medications    famotidine (Pepcid) 20 mg tablet    Other Relevant Orders    CBC and Auto Differential    Comprehensive metabolic panel (Completed)    Urinalysis with Reflex Microscopic (Completed)    H. pylori antigen, stool     Other Visit Diagnoses         Codes    Nausea    -  Primary R11.0    Relevant Medications    famotidine (Pepcid) 20 mg tablet    Other Relevant Orders    CBC and Auto Differential    Comprehensive metabolic panel (Completed)    Urinalysis with Reflex Microscopic (Completed)    H. pylori antigen, stool    Belching     R14.2    Relevant Medications    famotidine (Pepcid) 20 mg tablet    Other Relevant Orders    CBC and Auto Differential    Comprehensive metabolic panel (Completed)    Urinalysis with Reflex Microscopic (Completed)    H. pylori antigen, stool    Asymptomatic microscopic hematuria     R31.21    Relevant Orders    CBC and Auto Differential    Comprehensive metabolic panel (Completed)    Urinalysis with Reflex Microscopic (Completed)        Follow-up in 1 to 2 months or sooner if needed

## 2024-12-23 ENCOUNTER — LAB (OUTPATIENT)
Dept: LAB | Facility: LAB | Age: 64
End: 2024-12-23
Payer: MEDICARE

## 2024-12-23 DIAGNOSIS — R11.0 NAUSEA: ICD-10-CM

## 2024-12-23 DIAGNOSIS — R10.12 LEFT UPPER QUADRANT ABDOMINAL PAIN: ICD-10-CM

## 2024-12-23 DIAGNOSIS — R14.2 BELCHING: ICD-10-CM

## 2024-12-23 PROCEDURE — 87449 NOS EACH ORGANISM AG IA: CPT

## 2024-12-26 LAB — H PYLORI AG STL QL IA: NEGATIVE

## 2024-12-30 PROBLEM — M17.11 UNILATERAL PRIMARY OSTEOARTHRITIS, RIGHT KNEE: Status: ACTIVE | Noted: 2024-12-28

## 2025-01-13 DIAGNOSIS — R10.12 LEFT UPPER QUADRANT ABDOMINAL PAIN: ICD-10-CM

## 2025-01-13 DIAGNOSIS — R11.0 NAUSEA: ICD-10-CM

## 2025-01-13 DIAGNOSIS — R14.2 BELCHING: ICD-10-CM

## 2025-01-13 RX ORDER — FAMOTIDINE 20 MG/1
20 TABLET, FILM COATED ORAL 2 TIMES DAILY
Qty: 180 TABLET | Refills: 1 | Status: SHIPPED | OUTPATIENT
Start: 2025-01-13 | End: 2025-01-17 | Stop reason: SDUPTHER

## 2025-01-15 ENCOUNTER — PATIENT MESSAGE (OUTPATIENT)
Dept: PRIMARY CARE | Facility: CLINIC | Age: 65
End: 2025-01-15
Payer: COMMERCIAL

## 2025-01-15 DIAGNOSIS — R10.12 LEFT UPPER QUADRANT ABDOMINAL PAIN: ICD-10-CM

## 2025-01-15 DIAGNOSIS — R14.2 BELCHING: ICD-10-CM

## 2025-01-15 DIAGNOSIS — R11.0 NAUSEA: ICD-10-CM

## 2025-01-17 RX ORDER — FAMOTIDINE 20 MG/1
20 TABLET, FILM COATED ORAL DAILY
Qty: 14 TABLET | Refills: 0 | Status: SHIPPED | OUTPATIENT
Start: 2025-01-17 | End: 2025-01-31

## 2025-01-23 ENCOUNTER — OFFICE VISIT (OUTPATIENT)
Dept: ORTHOPEDIC SURGERY | Facility: CLINIC | Age: 65
End: 2025-01-23
Payer: MEDICARE

## 2025-01-23 DIAGNOSIS — M17.11 PRIMARY OSTEOARTHRITIS OF RIGHT KNEE: Primary | ICD-10-CM

## 2025-01-23 PROCEDURE — 99214 OFFICE O/P EST MOD 30 MIN: CPT | Performed by: ORTHOPAEDIC SURGERY

## 2025-01-23 PROCEDURE — 1123F ACP DISCUSS/DSCN MKR DOCD: CPT | Performed by: ORTHOPAEDIC SURGERY

## 2025-01-23 NOTE — PROGRESS NOTES
Patient is a 64-year-old female presents today for discussion of right total knee arthroplasty having decided to proceed with surgery.  She is failed a greater than 3-month trial reasonable conservative treatment including cortisone injections, anti-inflammatories and home exercise program.  She has a remote history of an arthroscopy.  She rates her pain as 8 out of 10.  She has difficulty walking sort of distance going downstairs.  She would like to proceed with total knee arthroplasty which is indicated at this time.    Right knee:  AAOx3, NAD, walks with a moderate antalgic gait  Varus allignment  Range of motion lacks 10 degrees of full extension and flexes to 110 degrees  Stable to varus/valgus/anterior/posterior stress through out the range of motion  Slight laxity with varus stress  Diffuse medial  joint line tenderness to palpation  Moderate effusion  SILT in a june/saph/per/tib distribution  5/5 knee extension/df/pf/ehl  ½ dorsalis pedis and posterior tibial pulse  no popliteal lymphadenopathy  no other overlying lesions  mood: euthymic  Respirations non labored    Plain films were reviewed by myself in clinic today.  They have advanced osteoarthritis of their knee with significant joint space narrowing, bone on bone changes, underlying sclerosis and tricompartmental osteophytic change.    Patient is now failed a greater than 3-month trial of reasonable conservative treatment and wishes proceed with surgery which is indicated at this time.  Discussed the risk benefits alternatives to surgery.  All of their questions were answered.    Procedure: right total knee  Location: [facility] [place]  ICD10: M17.11  CPT: 16816  Stay: outpatient  Equipment: [facility]    I talked with the patient at length about risks, limitations, benefits and alternatives to total knee replacement today. I reviewed concerns about implant wear, loosening, breakage, infection and infection prophylaxis, DVT, PE, death and other medical and  anesthetic complications of surgery. We talked about the potential for persistent pain following surgery since there are many possible causes for knee and leg pain. The patient was advised that knee replacement will only relieve pain that is coming from the knee. We talked about limited range of motion following knee replacement and the importance of physical therapy and their motivation. We talked about improvements in pain management post-operatively and our accelerated rehab program. We talked about wound healing issues and neurovascular complications of surgery. I reviewed functional and activity restrictions in detail. We discussed the fact that many of our patients are able to go home in 1 day or less depending on their health, mobility, pre-op preparation, individual home situation and personal preference.  The patient has identified their personal goals of their joint replacement surgery and recovery and we have discussed them. These are documented in our [employer] patient engagement platform. In addition, we have discussed the advantages and disadvantages of various implant and fixation options, as well as various surgical approaches.  The basic concepts of the joint replacement procedure has been reviewed with the patient and the patient has been provided the opportunity to see an actual implant either in the office or in our pre-op education class.  All of the patients questions were answered. The patient can call my office to schedule surgery and the pre-op teaching class. I told the patient that they should contact their primary care physician to discuss fitness for surgery.    This note was created using voice recognition software and was not corrected for typographical or grammatical errors.

## 2025-02-12 ENCOUNTER — APPOINTMENT (OUTPATIENT)
Dept: PREADMISSION TESTING | Facility: HOSPITAL | Age: 65
End: 2025-02-12
Payer: COMMERCIAL

## 2025-02-12 ENCOUNTER — APPOINTMENT (OUTPATIENT)
Dept: ORTHOPEDIC SURGERY | Facility: HOSPITAL | Age: 65
End: 2025-02-12
Payer: COMMERCIAL

## 2025-02-19 ENCOUNTER — PRE-ADMISSION TESTING (OUTPATIENT)
Dept: PREADMISSION TESTING | Facility: HOSPITAL | Age: 65
End: 2025-02-19
Payer: MEDICARE

## 2025-02-19 ENCOUNTER — LAB (OUTPATIENT)
Dept: LAB | Facility: HOSPITAL | Age: 65
End: 2025-02-19
Payer: MEDICARE

## 2025-02-19 ENCOUNTER — HOSPITAL ENCOUNTER (OUTPATIENT)
Dept: RADIOLOGY | Facility: HOSPITAL | Age: 65
Discharge: HOME | End: 2025-02-19
Payer: MEDICARE

## 2025-02-19 ENCOUNTER — EDUCATION (OUTPATIENT)
Dept: ORTHOPEDIC SURGERY | Facility: HOSPITAL | Age: 65
End: 2025-02-19
Payer: MEDICARE

## 2025-02-19 VITALS
RESPIRATION RATE: 16 BRPM | HEART RATE: 63 BPM | WEIGHT: 166.7 LBS | OXYGEN SATURATION: 98 % | TEMPERATURE: 97.3 F | BODY MASS INDEX: 28.46 KG/M2 | DIASTOLIC BLOOD PRESSURE: 80 MMHG | SYSTOLIC BLOOD PRESSURE: 130 MMHG | HEIGHT: 64 IN

## 2025-02-19 DIAGNOSIS — M17.11 UNILATERAL PRIMARY OSTEOARTHRITIS, RIGHT KNEE: ICD-10-CM

## 2025-02-19 DIAGNOSIS — Z01.818 ENCOUNTER FOR OTHER PREPROCEDURAL EXAMINATION: Primary | ICD-10-CM

## 2025-02-19 DIAGNOSIS — R79.9 ABNORMAL FINDING OF BLOOD CHEMISTRY, UNSPECIFIED: ICD-10-CM

## 2025-02-19 DIAGNOSIS — Z01.818 PREOP TESTING: Primary | ICD-10-CM

## 2025-02-19 LAB
ATRIAL RATE: 58 BPM
P AXIS: 22 DEGREES
P OFFSET: 190 MS
P ONSET: 151 MS
PR INTERVAL: 136 MS
Q ONSET: 219 MS
QRS COUNT: 10 BEATS
QRS DURATION: 78 MS
QT INTERVAL: 430 MS
QTC CALCULATION(BAZETT): 422 MS
QTC FREDERICIA: 424 MS
R AXIS: -5 DEGREES
T AXIS: 26 DEGREES
T OFFSET: 434 MS
VENTRICULAR RATE: 58 BPM

## 2025-02-19 PROCEDURE — 93010 ELECTROCARDIOGRAM REPORT: CPT | Performed by: INTERNAL MEDICINE

## 2025-02-19 PROCEDURE — 77073 BONE LENGTH STUDIES: CPT

## 2025-02-19 PROCEDURE — 99204 OFFICE O/P NEW MOD 45 MIN: CPT | Performed by: NURSE PRACTITIONER

## 2025-02-19 PROCEDURE — 87081 CULTURE SCREEN ONLY: CPT | Mod: BEALAB

## 2025-02-19 PROCEDURE — 73562 X-RAY EXAM OF KNEE 3: CPT | Mod: RT

## 2025-02-19 PROCEDURE — 93005 ELECTROCARDIOGRAM TRACING: CPT

## 2025-02-19 RX ORDER — CHLORHEXIDINE GLUCONATE ORAL RINSE 1.2 MG/ML
15 SOLUTION DENTAL DAILY
Qty: 30 ML | Refills: 0 | Status: SHIPPED | OUTPATIENT
Start: 2025-02-19 | End: 2025-02-21

## 2025-02-19 RX ORDER — CHLORHEXIDINE GLUCONATE 40 MG/ML
SOLUTION TOPICAL DAILY
Qty: 470 ML | Refills: 0 | Status: SHIPPED | OUTPATIENT
Start: 2025-02-19 | End: 2025-02-24

## 2025-02-19 ASSESSMENT — DUKE ACTIVITY SCORE INDEX (DASI)
TOTAL_SCORE: 58.2
CAN YOU HAVE SEXUAL RELATIONS: YES
CAN YOU DO YARD WORK LIKE RAKING LEAVES, WEEDING OR PUSHING A MOWER: YES
CAN YOU PARTICIPATE IN STRENOUS SPORTS LIKE SWIMMING, SINGLES TENNIS, FOOTBALL, BASKETBALL, OR SKIING: YES
CAN YOU CLIMB A FLIGHT OF STAIRS OR WALK UP A HILL: YES
CAN YOU DO MODERATE WORK AROUND THE HOUSE LIKE VACUUMING, SWEEPING FLOORS OR CARRYING GROCERIES: YES
CAN YOU WALK INDOORS, SUCH AS AROUND YOUR HOUSE: YES
CAN YOU PARTICIPATE IN MODERATE RECREATIONAL ACTIVITIES LIKE GOLF, BOWLING, DANCING, DOUBLES TENNIS OR THROWING A BASEBALL OR FOOTBALL: YES
CAN YOU TAKE CARE OF YOURSELF (EAT, DRESS, BATHE, OR USE TOILET): YES
CAN YOU DO LIGHT WORK AROUND THE HOUSE LIKE DUSTING OR WASHING DISHES: YES
CAN YOU RUN A SHORT DISTANCE: YES
CAN YOU DO HEAVY WORK AROUND THE HOUSE LIKE SCRUBBING FLOORS OR LIFTING AND MOVING HEAVY FURNITURE: YES
CAN YOU WALK A BLOCK OR TWO ON LEVEL GROUND: YES
DASI METS SCORE: 9.9

## 2025-02-19 ASSESSMENT — PAIN - FUNCTIONAL ASSESSMENT: PAIN_FUNCTIONAL_ASSESSMENT: 0-10

## 2025-02-19 NOTE — PREPROCEDURE INSTRUCTIONS
Medication List            Accurate as of February 19, 2025  8:01 AM. Always use your most recent med list.                atorvastatin 20 mg tablet  Commonly known as: Lipitor  Take 1 tablet (20 mg) by mouth once daily.  Medication Adjustments for Surgery: Take/Use as prescribed     * chlorhexidine 4 % external liquid  Commonly known as: Hibiclens  Apply topically once daily for 5 days.  Medication Adjustments for Surgery: Take/Use as prescribed     * chlorhexidine 0.12 % solution  Commonly known as: Peridex  Use 15 mL in the mouth or throat once daily for 2 doses. Mouthwash, use 15 ml the night before surgery and the morning of surgery. Swish and spit, do not swallow  Medication Adjustments for Surgery: Take/Use as prescribed     citalopram 20 mg tablet  Commonly known as: CeleXA  TAKE 2 TABLETS BY MOUTH DAILY X 60 TABS  Medication Adjustments for Surgery: Take/Use as prescribed  Additional Medication Adjustments for Surgery: Take last dose 7 days before surgery     famotidine 20 mg tablet  Commonly known as: Pepcid  Take 1 tablet (20 mg) by mouth once daily for 14 days.  Medication Adjustments for Surgery: Take/Use as prescribed     hydroCHLOROthiazide 12.5 mg capsule  Commonly known as: Microzide  Take 1 capsule (12.5 mg) by mouth once daily.  Medication Adjustments for Surgery: Take/Use as prescribed     levothyroxine 112 mcg tablet  Commonly known as: Synthroid, Levoxyl  Take 1 tablet (112 mcg) by mouth once daily.  Medication Adjustments for Surgery: Take/Use as prescribed     LORazepam 0.5 mg tablet  Commonly known as: Ativan  Take 1 tablet (0.5 mg) by mouth 2 times a day as needed for anxiety.  Medication Adjustments for Surgery: Do Not take on the morning of surgery     triamcinolone 0.1 % cream  Commonly known as: Kenalog  Apply topically 2 times a day. To areas of eczema and itchy spots on body for up to 2 weeks  Medication Adjustments for Surgery: Do Not take on the morning of surgery           *  This list has 2 medication(s) that are the same as other medications prescribed for you. Read the directions carefully, and ask your doctor or other care provider to review them with you.                NPO Instructions:     Do not eat any food after midnight the night before your surgery/procedure.  You may have clear liquids until TWO hours before surgery/procedure. This includes water, black tea/coffee, (no milk or cream) apple juice and electrolyte drinks (Gatorade).  You may chew gum up to TWO hours before your surgery/procedure.     Additional Instructions:      Seven/Six Days before Surgery:  Review your medication instructions, stop indicated medications  Five Days before Surgery:  Review your medication instructions, stop indicated medications  Begin using your Hibiclens  Three Days before Surgery:  Review your medication instructions, stop indicated medications  The Day before Surgery:  Start using 0.12% CHG mouthwash  No smoking or alcohol use 24 hours before surgery  Review your medication instructions, stop indicated medications  You will be contacted regarding the time of your arrival to facility and surgery time  Do not eat any food after Midnight  Day of Surgery:  Review your medication instructions, take indicated medications  If you have diabetes, please check your fasting blood sugar upon awakening.  If fasting blood sugar is <80 mg/dl, drink 100 ml of apple juice, time limit of 2 hours before  You may have clear liquids until TWO hours before surgery/procedure.  This includes water, black tea/coffee, (no milk or cream) apple juice and electrolyte drinks (Gatorade)  You may chew gum up to TWO hours before your surgery/procedure  Wear  comfortable loose fitting clothing  Do not use moisturizers, creams, lotions or perfume  All jewelry and valuables should be left at home     CONTACT SURGEON'S OFFICE IF YOU DEVELOP:  * Fever = 100.4 F   * New respiratory symptoms (e.g. cough, shortness of breath,  respiratory distress, sore throat)  * Recent loss of taste or smell  *Flu like symptoms such as headache, fatigue or gastrointestinal symptoms  * You develop any open sores, shingles, burning or painful urination   AND/OR:  * You no longer wish to have the surgery.  * Any other personal circumstances change that may lead to the need to cancel or defer this surgery.  *You were admitted to any hospital within one week of your planned procedure.     SMOKING:  *Quitting smoking can make a huge difference to your health and recovery from surgery.    *If you need help with quitting, call 2-456-QUIT-NOW.     THE DAY BEFORE SURGERY:  *Do not eat any food after midnight the night before your surgery.   *You may have up to TEN OUNCES of clear liquids until TWO hours before your instructed ARRIVAL TIME to hospital. This includes water, black tea/coffee, (no milk or cream) apple juice, clear broth and electrolyte drinks (Gatorade). Please avoid clear liquids that are red in color.   *You may chew gum/mints up to TWO hours before your surgery/procedure.     SURGICAL TIME:  *You will be contacted between 2 p.m. and 3 p.m. the business day before your surgery with your arrival time.  *If you haven't received a call by 3pm, call [phone]  *Scheduled surgery times may change and you will be notified if this occurs-check your personal voicemail for any updates.     ON THE MORNING OF SURGERY:  *Wear comfortable, loose fitting clothing.   *Do not use moisturizers, creams, lotions or perfume.  *All jewelry and valuables should be left at home.  *Prosthetic devices such as contact lenses, hearing aids, dentures, eyelash extensions, hairpins and body piercing must be removed before surgery.     BRING WITH YOU:  *Photo ID and insurance card  *Current list of medications and allergies  *Pacemaker/Defibrillator/Heart stent cards  *CPAP machine and mask  *Slings/splints/crutches  *Copy of your complete Advanced Directive/DHPOA-if  applicable  *Neurostimulator implant remote     PARKING AND ARRIVAL:  *Check in at the Main Entrance desk and let them know you are here for surgery.     IF YOU ARE HAVING OUTPATIENT/SAME DAY SURGERY:  *A responsible adult MUST accompany you at the time of discharge and stay with you for 24 hours after your surgery.  *You may NOT drive yourself home after surgery.  *You may use a taxi or ride sharing service ([employer], Uber) to return home ONLY if you are accompanied by a friend or family member.  *Instructions for resuming your medications will be provided by your surgeon.     Thank you for coming to Pre Admission testing.      If I have prescribed medication please don't forget to  at your pharmacy.      Any questions about today's visit call [phone] and leave a message in the general mailbox.     Patient instructed to ambulate as soon as possible postoperatively to decrease thromboembolic risk.     ILENE Hicks     Thank you for visiting [facility].  If you have any changes to your health condition, please call the surgeons office to alert them and give them details of your symptoms.        Preoperative Fasting Guidelines     Why must I stop eating and drinking near surgery time?  With sedation, food or liquid in your stomach can enter your lungs causing serious complications  Increases nausea and vomiting     When do I need to stop eating and drinking before my surgery?  Do not eat any food after midnight the night before your surgery/procedure.  You may have up to TEN ounces of clear liquid until TWO hours before your instructed arrival time to the hospital.  This includes water, black tea/coffee, (no milk or cream) apple juice, and electrolyte drinks (Gatorade)  You may chew gum until TWO hours before your surgery/procedure        Additional Instructions:      The Day before Surgery:  -Review your medication instructions, stop indicated medications  -You will be  contacted in the evening regarding the time of your arrival to facility and surgery time     Day of Surgery:  -Review your medication instructions, take indicated medications  -Wear comfortable loose fitting clothing  -Do not use moisturizers, creams, lotions or perfume  -All jewelry and valuables should be left at home                   Preoperative Brain Exercises     What are brain exercises?  A brain exercise is any activity that engages your thinking (cognitive) skills.     What types of activities are considered brain exercises?  Jigsaw puzzles, crossword puzzles, word jumble, memory games, word search, and many more.  Many can be found free online or on your phone via a mobile alli.     Why should I do brain exercises before my surgery?  More recent research has shown brain exercise before surgery can lower the risk of postoperative delirium (confusion) which can be especially important for older adults.  Patients who did brain exercises for 5 to 10 minutes/day in the days before surgery, cut their risk of postoperative delirium in half up to 1 week after surgery.                         [facility]     Preoperative Deep Breathing Exercises     Why it is important to do deep breathing exercises before my surgery?  Deep breathing exercises strengthen your breathing muscles.  This helps you to recover after your surgery and decreases the chance of breathing complications.        How are the deep breathing exercises done?  Sit straight with your back supported.  Breathe in deeply and slowly through your nose. Your lower rib cage should expand and your abdomen may move forward.  Hold that breath for 3 to 5 seconds.  Breathe out through pursed lips, slowly and completely.  Rest and repeat 10 times every hour while awake.  Rest longer if you become dizzy or lightheaded.                      [facility]     Preoperative Deep Breathing Exercises     Why it is  important to do deep breathing exercises before my surgery?  Deep breathing exercises strengthen your breathing muscles.  This helps you to recover after your surgery and decreases the chance of breathing complications.        How are the deep breathing exercises done?  Sit straight with your back supported.  Breathe in deeply and slowly through your nose. Your lower rib cage should expand and your abdomen may move forward.  Hold that breath for 3 to 5 seconds.  Breathe out through pursed lips, slowly and completely.  Rest and repeat 10 times every hour while awake.  Rest longer if you become dizzy or lightheaded.        Patient Information: Incentive Spirometer  What is an incentive spirometer?  An incentive spirometer is a device used before and after surgery to “exercise” your lungs.  It helps you to take deeper breaths to expand your lungs.  Below is an example of a basic incentive spirometer.  The device you receive may differ slightly but they all function the same.    Why do I need to use an incentive spirometer?  Using your incentive spirometer prepares your lungs for surgery and helps prevent lung problems after surgery.  How do I use my incentive spirometer?  When you're using your incentive spirometer, make sure to breathe through your mouth. If you breathe through your nose, the incentive spirometer won't work properly. You can hold your nose if you have trouble.  If you feel dizzy at any time, stop and rest. Try again at a later time.  Follow the steps below:  Set up your incentive spirometer, expand the flexible tubing and connect to the outlet.  Sit upright in a chair or bed. Hold the incentive spirometer at eye level.   Put the mouthpiece in your mouth and close your lips tightly around it. Slowly breathe out (exhale) completely.  Breathe in (inhale) slowly through your mouth as deeply as you can. As you take a breath, you will see the piston rise inside the large column. While the piston rises, the  indicator should move upwards. It should stay in between the 2 arrows (see Figure).  Try to get the piston as high as you can, while keeping the indicator between the arrows.   If the indicator doesn't stay between the arrows, you're breathing either too fast or too slow.  When you get it as high as you can, hold your breath for 10 seconds, or as long as possible. While you're holding your breath, the piston will slowly fall to the base of the spirometer.  Once the piston reaches the bottom of the spirometer, breathe out slowly through your mouth. Rest for a few seconds.  Repeat 10 times. Try to get the piston to the same level with each breath.  Repeat every hour while awake  You can carefully clean the outside of the mouthpiece with an alcohol wipe or soap and water.       Patient and Family Education             Ways You Can Help Prevent Blood Clots                    This handout explains some simple things you can do to help prevent blood clots.      Blood clots are blockages that can form in the body's veins. When a blood clot forms in your deep veins, it may be called a deep vein thrombosis, or DVT for short. Blood clots can happen in any part of the body where blood flows, but they are most common in the arms and legs. If a piece of a blood clot breaks free and travels to the lungs, it is called a pulmonary embolus (PE). A PE can be a very serious problem.         Being in the hospital or having surgery can raise your chances of getting a blood clot because you may not be well enough to move around as much as you normally do.         Ways you can help prevent blood clots in the hospital           Wearing SCDs. SCDs stands for Sequential Compression Devices.   SCDs are special sleeves that wrap around your legs  They attach to a pump that fills them with air to gently squeeze your legs every few minutes.   This helps return the blood in your legs to your heart.   SCDs should only be taken off when walking or  bathing.   SCDs may not be comfortable, but they can help save your life.                                            Wearing compression stockings - if your doctor orders them. These special snug fitting stockings gently squeeze your legs to help blood flow.       Walking. Walking helps move the blood in your legs.   If your doctor says it is ok, try walking the halls at least   5 times a day. Ask us to help you get up, so you don't fall.      Taking any blood thinning medicines your doctor orders.        Page 1 of 2            [facility]; 3/23   Ways you can help prevent blood clots at home         Wearing compression stockings - if your doctor orders them. ? Walking - to help move the blood in your legs.       Taking any blood thinning medicines your doctor orders.      Signs of a blood clot or PE        Tell your doctor or nurse know right away if you have of the problems listed below.    If you are at home, seek medical care right away. Call 911 for chest pain or problems breathing.                Signs of a blood clot (DVT) - such as pain,  swelling, redness or warmth in your arm or leg      Signs of a pulmonary embolism (PE) - such as chest pain or feeling short of breath    Patient Information: Pre-Operative Infection Prevention Measures     Why did I have my nose, under my arms, and groin swabbed?  The purpose of the swab is to identify Staphylococcus aureus inside your nose or on your skin.  The swab was sent to the laboratory for culture.  A positive swab/culture for Staphylococcus aureus is called colonization or carriage.      What is Staphylococcus aureus?  Staphylococcus aureus, also known as “staph”, is a germ found on the skin or in the nose of healthy people.  Sometimes Staphylococcus aureus can get into the body and cause an infection.  This can be minor (such as pimples, boils, or other skin problems).  It might also be serious (such as a blood infection, pneumonia, or a surgical site  infection).    What is Staphylococcus aureus colonization or carriage?  Colonization or carriage means that a person has the germ but is not sick from it.  These bacteria can be spread on the hands or when breathing or sneezing.    How is Staphylococcus aureus spread?  It is most often spread by close contact with a person or item that carries it.    What happens if my culture is positive for Staphylococcus aureus?  Your doctor/medical team will use this information to guide any antibiotic treatment which may be necessary.  Regardless of the culture results, we will clean the inside of your nose with a betadine swab just before you have your surgery.      Will I get an infection if I have Staphylococcus aureus in my nose or on my skin?  Anyone can get an infection with Staphylococcus aureus.  However, the best way to reduce your risk of infection is to follow the instructions provided to you for the use of your CHG soap and dental rinse.        Patient Information: Oral/Dental Rinse    What is oral/dental rinse?   It is a mouthwash. It is a way of cleaning the mouth with a germ-killing solution before your surgery.  The solution contains chlorhexidine, commonly known as CHG.   It is used inside the mouth to kill a bacteria known as Staphylococcus aureus.  Let your doctor know if you are allergic to Chlorhexidine.    We have sent a prescription for CHG 0.12% mouthwash to your preferred pharmacy.  If you have not already, Please  your prescription and start using the day before before surgery.  Follow the instruction sheet provided to you at your CPM/PAT appointment. Please contact [facility] if you do not receive your CHG mouthwash prescription.     Why do I need to use CHG oral/dental rinse?  The CHG oral/dental rinse helps to kill a bacteria in your mouth known as Staphylococcus aureus.     This reduces the risk of infection at the surgical site.      Using your CHG oral/dental rinse  STEPS:  Use your CHG  oral/dental rinse after you brush your teeth the night before (at bedtime) and the morning of your surgery.  Follow all directions on your prescription label.    Use the cap on the container to measure 15ml   Swish (gargle if you can) the mouthwash in your mouth for at least 30 seconds, (do not swallow) and spit out  After you use your CHG rinse, do not rinse your mouth with water, drink or eat.  Please refer to the prescription label for the appropriate time to resume oral intake      What side effects might I have using the CHG oral/dental rinse?  CHG rinse will stick to plaque on the teeth.  Brush and floss just before use.  Teeth brushing will help avoid staining of plaque during use.      Patient Information: Home Preoperative Antibacterial Shower      What is a home preoperative antibacterial shower?  This shower is a way of cleaning the skin with a germ-killing solution before surgery.  The solution contains chlorhexidine, commonly known as CHG.  CHG is a skin cleanser with germ-killing ability.  Let your doctor know if you are allergic to chlorhexidine.    Why do I need to take a preoperative antibacterial shower?  Skin is not sterile.  It is best to try to make your skin as free of germs as possible before surgery.  Proper cleansing with a germ-killing soap before surgery can lower the number of germs on your skin.  This helps to reduce the risk of infection at the surgical site.  Following the instructions listed below will help you prepare your skin for surgery.      How do I use the solution?  Steps:  Begin using your CHG soap 5 days before your scheduled surgery on 3/1/25.    First, wash and rinse your hair using the CHG soap. Keep CHG soap away from ear canals and eyes.  Rinse completely, do not condition.  Hair extensions should be removed.  Wash your face with your normal soap and rinse.    Apply the CHG solution to a clean wet washcloth.  Turn the water off or move away from the water spray to avoid  premature rinsing of the CHG soap as you are applying.   Firmly lather your entire body from the neck down.  Do not use on your face.  Pay special attention to the area(s) where your incision(s) will be located unless they are on your face.  Avoid scrubbing your skin too hard.  The important point is to have the CHG soap sit on your skin for 3 minutes.    When the 3 minutes are up, turn on the water and rinse the CHG solution off your body completely.   DO NOT wash with regular soap after you have used the CHG soap solution  Pat yourself dry with a clean, freshly-laundered towel.  DO NOT apply powders, deodorants, or lotions.  Dress in clean, freshly laundered nightclothes.    Be sure to sleep with clean, freshly laundered sheets.  Be aware that CHG will cause stains on fabrics; if you wash them with bleach after use.  Rinse your washcloth and other linens that have contact with CHG completely.  Use only non-chlorine detergents to launder the items used.   The morning of surgery is the fifth day.  Repeat the above steps and dress in clean comfortable clothing     Whom should I contact if I have any questions regarding the use of CHG soap?  Call the [facility] at [phone] if you have any questions.

## 2025-02-19 NOTE — CPM/PAT H&P
CPM/PAT Evaluation       Name: Mary Kate Short (Mary Kate Short)  /Age: 1960/64 y.o.     In-Person       Chief Complaint: Unilateral primary osteoarthritis, right knee     HPI  Patient is an alert and oriented 64 year old female scheduled for a  Knee Replacement Total Cement Unilat on 3/5/25 with Dr. treadwell for Unilateral primary osteoarthritis, right knee. PMHX includes HTN, JOSUÉ, hypothyroidism, Presents to [facility] PAT today for perioperative risk stratification and optimization.     Past Medical History:   Diagnosis Date    Cat bite [id number]       Past Surgical History:   Procedure Laterality Date    OTHER SURGICAL HISTORY  10/14/2020    Oophorectomy bilateral    OTHER SURGICAL HISTORY  10/14/2020    Hysterectomy    OTHER SURGICAL HISTORY  10/14/2020    Appendectomy       Patient  has no history on file for sexual activity.    Family History   Problem Relation Name Age of Onset    Other (Malignant melanoma) Father      Other (CVA) Other Multiple members     Heart disease Other Multiple members        Allergies   Allergen Reactions    Penicillins Rash       Prior to Admission medications    Medication Sig Start Date End Date Taking? Authorizing Provider   atorvastatin (Lipitor) 20 mg tablet Take 1 tablet (20 mg) by mouth once daily. 24  Santana Lea MD   citalopram (CeleXA) 20 mg tablet TAKE 2 TABLETS BY MOUTH DAILY X 60 TABS 24   ILENE Funk   famotidine (Pepcid) 20 mg tablet Take 1 tablet (20 mg) by mouth once daily for 14 days. 25  ILENE Funk   hydroCHLOROthiazide (Microzide) 12.5 mg capsule Take 1 capsule (12.5 mg) by mouth once daily. 24  Santana eLa MD   levothyroxine (Synthroid, Levoxyl) 112 mcg tablet Take 1 tablet (112 mcg) by mouth once daily. 10/23/24 10/23/25  Chandrakant Royal MD   LORazepam (Ativan) 0.5 mg tablet Take 1 tablet (0.5 mg) by mouth 2 times a day as needed for anxiety. 2/5/24 3/6/24  Santana GALLO  "MD Venu   triamcinolone (Kenalog) 0.1 % cream Apply topically 2 times a day. To areas of eczema and itchy spots on body for up to 2 weeks 3/21/24   Ammy Gonzales MD      Visit Vitals  /80   Pulse 63   Temp 36.3 °C (97.3 °F)   Resp 16   Ht 1.626 m (5' 4\")   Wt 75.6 kg (166 lb 11.2 oz)   LMP  (LMP Unknown)   SpO2 98%   BMI 28.61 kg/m²   OB Status Hysterectomy   Smoking Status Never   BSA 1.85 m²       Review of Systems   Constitutional: Negative for chills, decreased appetite, diaphoresis, fever and malaise/fatigue.   Eyes:  Negative for blurred vision and double vision.   Cardiovascular:  Negative for chest pain, claudication, cyanosis, dyspnea on exertion, irregular heartbeat, leg swelling, near-syncope and palpitations.   Respiratory:  Negative for cough, hemoptysis, shortness of breath and wheezing.    Endocrine: Negative for cold intolerance, heat intolerance, polydipsia, polyphagia and polyuria.   Gastrointestinal:  Negative for abdominal pain, constipation, diarrhea, dysphagia, nausea and vomiting.   Genitourinary:  Negative for bladder incontinence, dysuria, hematuria, incomplete emptying, nocturia, frequency, pelvic pain and urgency.   Neurological:  Negative for headaches, light-headedness, paresthesias, sensory change and weakness.   Psychiatric/Behavioral:  Negative for altered mental status.    Musculoskeletal: Positive for myalgias, arthralgias     Vitals and nursing note reviewed.     Physical exam  Constitutional:       Appearance: Normal appearance. She is Overweight.   HENT:      Head: Normocephalic and atraumatic.      Mouth/Throat:      Mouth: Mucous membranes are moist.      Pharynx: Oropharynx is clear.   Eyes:      Extraocular Movements: Extraocular movements intact.      Conjunctiva/sclera: Conjunctivae normal.      Pupils: Pupils are equal, round, and reactive to light.   Cardiovascular:      PMI at left midclavicular line. Normal rate. Regular rhythm. Normal S1. Normal S2.     "   Murmurs: There is no murmur.      No gallop.  No click. No rub.       No audible carotid bruit     No lower extremity edema on exam  Pulmonary:      Effort: Pulmonary effort is normal.      Breath sounds: Normal breath sounds.   Abdominal:      General: Abdomen is flat. Bowel sounds are normal.      Palpations: Abdomen is soft and non-tender  Musculoskeletal:      Cervical back: Normal range of motion and neck supple.   Skin:     General: Skin is warm and dry.      Capillary Refill: Capillary refill takes less than 2 seconds.   Neurological:      General: No focal deficit present.      Mental Status: She is alert and oriented to person, place, and time. Mental status is at baseline.   Psychiatric:         Mood and Affect: Mood normal.         Behavior: Behavior normal.         Thought Content: Thought content normal.         Judgment: Judgment normal.     Vitals and nursing note reviewed. Physical exam within normal limits.     DASI Risk Score      Flowsheet Row Pre-Admission Testing from 2/19/2025 in [facility] Questionnaire Series Submission from 12/30/2024 in [facility] with Generic Provider Evelyn   Can you take care of yourself (eat, dress, bathe, or use toilet)?  2.75 filed at 02/19/2025 0834 2.75  filed at 12/30/2024 1109   Can you walk indoors, such as around your house? 1.75 filed at 02/19/2025 0834 1.75  filed at 12/30/2024 1109   Can you walk a block or two on level ground?  2.75 filed at 02/19/2025 0834 2.75  filed at 12/30/2024 1109   Can you climb a flight of stairs or walk up a hill? 5.5 filed at 02/19/2025 0834 5.5  filed at 12/30/2024 1109   Can you run a short distance? 8 filed at 02/19/2025 0834 8  filed at 12/30/2024 1109   Can you do light work around the house like dusting or washing dishes? 2.7 filed at 02/19/2025 0834 2.7  filed at 12/30/2024 1109   Can you do moderate work around the house like vacuuming, sweeping floors or carrying groceries? 3.5 filed at 02/19/2025  0834 3.5  filed at 12/30/2024 1109   Can you do heavy work around the house like scrubbing floors or lifting and moving heavy furniture?  8 filed at 02/19/2025 0834 8  filed at 12/30/2024 1109   Can you do yard work like raking leaves, weeding or pushing a mower? 4.5 filed at 02/19/2025 0834 4.5  filed at 12/30/2024 1109   Can you have sexual relations? 5.25 filed at 02/19/2025 0834 5.25  filed at 12/30/2024 1109   Can you participate in moderate recreational activities like golf, bowling, dancing, doubles tennis or throwing a baseball or football? 6 filed at 02/19/2025 0834 6  filed at 12/30/2024 1109   Can you participate in strenous sports like swimming, singles tennis, football, basketball, or skiing? 7.5 filed at 02/19/2025 0834 7.5  filed at 12/30/2024 1109   DASI SCORE 58.2 filed at 02/19/2025 0834 58.2  filed at 12/30/2024 1109   METS Score (Will be calculated only when all the questions are answered) 9.9 filed at 02/19/2025 0834 9.9  filed at 12/30/2024 1109          Caprini DVT Assessment      Flowsheet Row Pre-Admission Testing from 2/19/2025 in [facility]   Surgical Factors Major surgery planned, lasting 2-3 hours filed at 02/19/2025 0833          Modified Frailty Index    No data to display       CHADS2 Stroke Risk  Current as of 30 minutes ago        N/A 3 to 100%: High Risk   2 to < 3%: Medium Risk   0 to < 2%: Low Risk     Last Change: N/A          This score determines the patient's risk of having a stroke if the patient has atrial fibrillation.        This score is not applicable to this patient. Components are not calculated.          Revised Cardiac Risk Index      Flowsheet Row Pre-Admission Testing from 2/19/2025 in [facility]   High-Risk Surgery (Intraperitoneal, Intrathoracic,Suprainguinal vascular) 0 filed at 02/19/2025 0834   History of ischemic heart disease (History of MI, History of positive exercuse test, Current chest paint considered due to myocardial  ischemia, Use of nitrate therapy, ECG with pathological Q Waves) 0 filed at 02/19/2025 0834   History of congestive heart failure (pulmonary edemia, bilateral rales or S3 gallop, Paroxysmal nocturnal dyspnea, CXR showing pulmonary vascular redistribution) 0 filed at 02/19/2025 0834   History of cerebrovascular disease (Prior TIA or stroke) 0 filed at 02/19/2025 0834   Pre-operative insulin treatment 0 filed at 02/19/2025 0834   Pre-operative creatinine>2 mg/dl 0 filed at 02/19/2025 0834   Revised Cardiac Risk Calculator 0 filed at 02/19/2025 0834          Apfel Simplified Score    No data to display       Risk Analysis Index Results This Encounter    No data found in the last 10 encounters.       Stop Bang Score      Flowsheet Row Pre-Admission Testing from 2/19/2025 in [facility] Questionnaire Series Submission from 12/30/2024 in [facility] with Generic Provider Evelyn   Do you snore loudly? 0 filed at 02/19/2025 0748 0  filed at 12/30/2024 1109   Do you often feel tired or fatigued after your sleep? 0 filed at 02/19/2025 0748 0  filed at 12/30/2024 1109   Has anyone ever observed you stop breathing in your sleep? 1 filed at 02/19/2025 0748 0  filed at 12/30/2024 1109   Do you have or are you being treated for high blood pressure? 0 filed at 02/19/2025 0748 1  filed at 12/30/2024 1109   Recent BMI (Calculated) 28.6 filed at 02/19/2025 0748 27.8  filed at 12/30/2024 1109   Is BMI greater than 35 kg/m2? 0=No filed at 02/19/2025 0748 0=No  filed at 12/30/2024 1109   Age older than 50 years old? 1=Yes filed at 02/19/2025 0748 1=Yes  filed at 12/30/2024 1109   Is your neck circumference greater than 17 inches (Male) or 16 inches (Female)? 0 filed at 02/19/2025 0748 --   Gender - Male 0=No filed at 02/19/2025 0748 0=No  filed at 12/30/2024 1109   STOP-BANG Total Score 2 filed at 02/19/2025 0748 --          Prodigy: High Risk  Total Score: 8              Prodigy Age Score           ARISCAT Score for  Postoperative Pulmonary Complications    No data to display       Yessica Perioperative Risk for Myocardial Infarction or Cardiac Arrest (DAYNE)      Flowsheet Row Pre-Admission Testing from 2/19/2025 in [facility]   Calculated Age Score 1.32 filed at 02/19/2025 0834   Functional Status  0 filed at 02/19/2025 0834   ASA Class  -3.29 filed at 02/19/2025 0834   Creatinine 0 filed at 02/19/2025 0834   Type of Procedure  0.80 filed at 02/19/2025 0834   DAYNE Total Score  -6.42 filed at 02/19/2025 0834   DAYNE % 0.16 filed at 02/19/2025 0834            Assessment & Plan:    Neuro:  Depression(citalopram, ativan).     HEENT/Airway:  Pt has JOSUÉ, currently using a cpap   STOP-BANG Score-2 points lowrisk for JOSUÉ    Mallampati::  II    TM distance::  >3 FB    Neck ROM::  Full  Dentures-denies  Crowns-reports x 1  Implants-denies    Cardiovascular:  HTN, HLD (atorvastatin, hydrochlorothiazide)  METS: 9.9  RCRI: 0 points, 3.9%  risk for postoperative MACE   DAYNE: 0.16% risk for postoperative MACE  EKG -normal sinus rhythm Rate- No acute changes.     Pulmonary:  No diagnosis or significant findings on chart review or clinical presentation and evaluation.   ARISCAT: <26 points, 1.6% risk of in-hospital postoperative pulmonary complication  PRODIGY: Low risk for opioid induced respiratory depression  Smoking History-She has never smoked.  Discussed smoking cessation and deep breathing handout given    Renal/Urinary:  No diagnosis or significant findings on chart review or clinical presentation and evaluation, however, the patient is at increased risk of perioperative renal complications secondary to HTN. Preventative measures include BP monitoring, medication compliance, and hydration management.   CMP  Creatinine-0.68  GFR-90    Endocrine:  No diagnosis or significant findings on chart review or clinical presentation and evaluation.   [id number]    Hematologic/Immunology:  No diagnosis or significant findings on  chart review or clinical presentation and evaluation.  The patient is not a Jehovah’s witness and will accept blood and blood products if medically indicated.   History of previous blood transfusions No  CBC  H/H-12.2/37/7  Caprini Score 3, patient at Low for postoperative DVT. Pt supplied education/VTE handout  Anticoagulation use: No     Gastrointestinal:   GERD (famotidine)  Recreational drug use: none  Alcohol use 1 glasses of wine per month    Infectious disease:   No diagnosis or significant findings on chart review or clinical presentation and evaluation.   Prescription provided for CHG body wash and dental rinse. CHG use instructions reviewed and provided to patient.  Staph screen collected-Pending    Musculoskeletal:   No diagnosis or significant findings on chart review or clinical presentation and evaluation.   JHFRAT score- 1 points. low risk for falls    Anesthesia:  ASA 2 - Patient with mild systemic disease with no functional limitations  Anticipated anesthesia-spinal  History of General anesthesia- yes  Complications- PONV  No family history of anesthesia complications    Abnormalities noted on PAT evaluation: No    Labs & Imaging ordered:  A1C, MRSA, EKg  Nickel/metal allergy-negative  Shellfish allergy-negative    Discussed with patient medication instructions, NPO guidelines, and any questions or concerns.      time spent 45 minutes

## 2025-02-19 NOTE — GROUP NOTE
In addition to the group class activities, Mary Kate Short had the following lab work completed:  No orders of the defined types were placed in this encounter.      A new History and Physical was not completed.    This class lasted approximately 2 hour and had 6 participants. The nurse instructor covered the following topics:    MyChart Enrollment  Communication with Care Team  My Chart is the best form of communication to reach all of your caregivers  You can send messages to specific care givers, or a care team  Continued Education  You will be enrolled in a Joint Replacement care plan to receive additional education before and after surgery  You can review a short recording of the class content - [email or web address]  Access to Medical Records  You can access test results, office notes, appointments, etc.  You can connect to other healthcare systems who use [employer] ([facility], [facility], [facility], etc.)  [employer]  Program Information  Consent to Enroll    Background/Understanding of Joint Replacement Surgery  Potential for same day discharge  Any questions or concerns to be directed to the surgeon's office  Not all patients are appropriate for same day discharge  All patients will be required to meet discharge criteria prior to leaving our care    How to Prepare for Surgery  Use of Recreational Products (Nicotine, Alcohol, THC, CBD, Drugs, Etc.)  The ultimate goal is to quit using thee products!  Stop several weeks before surgery  Such products slow down the healing process and increase risk of post-op infection and complications  Clearance for Surgery  Preadmission Testing - Appropriateness for anesthesia  Medical Clearance by Specialists  Dental Clearance  Cracked/Broken/Loose teeth left untreated may postpone surgery  The importance of post-op antibiotics for dental visits per surgeon protocol  Preadmission Testing  **Potential for postponed surgery if appropriate clearance is not  obtained  Medication Instruction  Follow instructions provided by the doctor who prescribes your medication (typically, but not limited to cardiologist)  Preadmission testing will provide additional instructions during your appointment on what to stop and what to take as you get closer to surgery  For clarification of these instructions, please call preadmission testing directly - [phone]  Tips for Preparing the home for discharge from the hospital  Care Partner  Requirement for surgery, the patient must have a plan to have help at home  Potential for postponed surgery if plan for home support cannot be established  Your Care Partner does not need medical training  How the care partner can help after surgery  CHG Body Wash/Mouth Wash  Follow the instructions given at preadmission testing  Body wash is to be used on the body and hair for 5 washes  Mouthwash is to be used the night before and morning of surgery  **This is a system-wide protocol developed by infectious disease professionals, we will not alter our recommendations for those with sensitive skin or those who have special hair needs.  Please follow the instructions as they are written as this will provide the best infection prevention measures for surgery.  Should you have an allergy to one of the products, please discuss with your preadmission team**    What to Expect in the Hospital/At Home  Morning of Surgery NPO Guidelines  Nothing to eat after midnight  Water can be consumed up to 2 hours prior to arrival  Surgical and Post-Surgical Care Team  Surgical Team  Anesthesia Team  Nursing  Physical Therapy  Care Coordinating  Pharmacy  Hospital Arrival Instructions  Arrive at the time provided to you  Consider traffic patterns (rush-hour) based on arrival time  Have arrangements made for a ride home  If discharging same day, care partner should remain at the hospital  Recovering after Surgery  Recovery Room - Visitors are not brought back  Transition to  hospital room - 2nd Floor, Visitors will be directed to your room  The presence of and strategies for controlling surgical pain and swelling  The importance of early mobility  Side effects after surgery  What to expect if staying overnight    Discharge Planning  The intended plan for discharge will be for patients to discharge home  All patients require a care partner (family, friend, neighbor, etc.) to stay with the patient for the first few nights after surgery  The inability to secure help at home may postpone surgery  Home Care Services set up per surgeon order  Physical Therapy  Occupational Therapy  **If desired, private duty care can be arranged by the patient ahead of time**  Outpatient Physical Therapy per surgeon order    Recovering at Home  Wound Care  Follow wound care instructions found in your discharge paperwork  Bandage is water-resistant and you may shower with the bandage  Do not scrub directly over the bandage  Do not submerge in water until cleared (bathtub, hot tub, pool, etc.)    Post-Op Risk Prevention  Infection Prevention  Promptly seek treatment for any infections post-operatively  Routine dental visits must be postponed for 3 months after surgery  Your surgeon may require antibiotics prior to future dental visits  Any concerns for infection not related directly to the knee or the hip should be managed by your primary care provider  Blood Clots  Be sure to complete the course of blood thinning medication as prescribed by your surgeon  Movement every 1-2 hours during the day is encouraged to prevent blood clots  Monitor for signs of blood clots  Wear compression stockings as prescribed by your surgeon  Constipation  Constipation is common following surgery  Drink plenty of fluids  Take stool softener/laxative as prescribed by your surgeon  Move around frequently  Eat foods high in fiber  Fall Prevention  Prepare home ahead of time to clear space to move with walker  Remove throw rugs and  electrical cords from walkways  Install railings near any stairways with more than 2 steps  Use night lights for increased visibility at night  Continue to use your assistive device until cleared by surgeon or physical therapy  Dislocation Prevention - Not all procedures will have dislocation precautions  Follow dislocation precautions provided by your surgeon  It is OK to resume sexual activity about 6 weeks following surgery  Be sure to follow any dislocation precautions assigned    Durable Medical Equipment  Cold Therapy  Breg Cold Therapy Machines  Ice/Gel Packs  Assistive Devices  Folding Walker with Wheels (in the front only)  No Rollators  Crutches if approved by Physical Therapy and Surgeon after surgery  Hip Kits  Raised Toilet Seats  Additional Compression Stockings    Joint Preservation  Healthy Activities when Cleared  Walking  Swimming  Bike Riding  Activities to Avoid  Refrain from repetitive motions which have a high impact on the joint  Gradual Progression  Progress activity slowly, listen to your body  Common Findings - NORMAL after surgery  Clicking/Grinding  Numbness near incision    Physical Therapy  Prehabilitation exercises  START TODAY ON BOTH LEGS  Surgery Specific Precautions  Follow surgery specific precautions found in your discharge paperwork    Follow-Up Visit  All patients will see their surgeon for a follow up visit after surgery  The visit may range from 2-6 weeks after surgery and is surgeon specific      Please don't hesitate to reach out if you have any additional questions or concerns.    Enedelia Felton MBA, BSN, RN-BC, ONC  JENNY Johnson, RN  Radha Roberts, EARNEST  Orthopedic Program Navigators  [facility]   [phone]

## 2025-02-19 NOTE — H&P (VIEW-ONLY)
CPM/PAT Evaluation       Name: Mary Kate Short (Mary Kate Short)  /Age: 1960/64 y.o.     In-Person       Chief Complaint: Unilateral primary osteoarthritis, right knee     HPI  Patient is an alert and oriented 64 year old female scheduled for a  Knee Replacement Total Cement Unilat on 3/5/25 with Dr. treadwell for Unilateral primary osteoarthritis, right knee. PMHX includes HTN, JOSUÉ, hypothyroidism, Presents to [facility] PAT today for perioperative risk stratification and optimization.     Past Medical History:   Diagnosis Date    Cat bite [id number]       Past Surgical History:   Procedure Laterality Date    OTHER SURGICAL HISTORY  10/14/2020    Oophorectomy bilateral    OTHER SURGICAL HISTORY  10/14/2020    Hysterectomy    OTHER SURGICAL HISTORY  10/14/2020    Appendectomy       Patient  has no history on file for sexual activity.    Family History   Problem Relation Name Age of Onset    Other (Malignant melanoma) Father      Other (CVA) Other Multiple members     Heart disease Other Multiple members        Allergies   Allergen Reactions    Penicillins Rash       Prior to Admission medications    Medication Sig Start Date End Date Taking? Authorizing Provider   atorvastatin (Lipitor) 20 mg tablet Take 1 tablet (20 mg) by mouth once daily. 24  Santana Lea MD   citalopram (CeleXA) 20 mg tablet TAKE 2 TABLETS BY MOUTH DAILY X 60 TABS 24   ILENE Funk   famotidine (Pepcid) 20 mg tablet Take 1 tablet (20 mg) by mouth once daily for 14 days. 25  ILENE Funk   hydroCHLOROthiazide (Microzide) 12.5 mg capsule Take 1 capsule (12.5 mg) by mouth once daily. 24  Santana Lea MD   levothyroxine (Synthroid, Levoxyl) 112 mcg tablet Take 1 tablet (112 mcg) by mouth once daily. 10/23/24 10/23/25  Chandrakant Royal MD   LORazepam (Ativan) 0.5 mg tablet Take 1 tablet (0.5 mg) by mouth 2 times a day as needed for anxiety. 2/5/24 3/6/24  Santana GALLO  "MD Venu   triamcinolone (Kenalog) 0.1 % cream Apply topically 2 times a day. To areas of eczema and itchy spots on body for up to 2 weeks 3/21/24   Ammy Gonzales MD      Visit Vitals  /80   Pulse 63   Temp 36.3 °C (97.3 °F)   Resp 16   Ht 1.626 m (5' 4\")   Wt 75.6 kg (166 lb 11.2 oz)   LMP  (LMP Unknown)   SpO2 98%   BMI 28.61 kg/m²   OB Status Hysterectomy   Smoking Status Never   BSA 1.85 m²       Review of Systems   Constitutional: Negative for chills, decreased appetite, diaphoresis, fever and malaise/fatigue.   Eyes:  Negative for blurred vision and double vision.   Cardiovascular:  Negative for chest pain, claudication, cyanosis, dyspnea on exertion, irregular heartbeat, leg swelling, near-syncope and palpitations.   Respiratory:  Negative for cough, hemoptysis, shortness of breath and wheezing.    Endocrine: Negative for cold intolerance, heat intolerance, polydipsia, polyphagia and polyuria.   Gastrointestinal:  Negative for abdominal pain, constipation, diarrhea, dysphagia, nausea and vomiting.   Genitourinary:  Negative for bladder incontinence, dysuria, hematuria, incomplete emptying, nocturia, frequency, pelvic pain and urgency.   Neurological:  Negative for headaches, light-headedness, paresthesias, sensory change and weakness.   Psychiatric/Behavioral:  Negative for altered mental status.    Musculoskeletal: Positive for myalgias, arthralgias     Vitals and nursing note reviewed.     Physical exam  Constitutional:       Appearance: Normal appearance. She is Overweight.   HENT:      Head: Normocephalic and atraumatic.      Mouth/Throat:      Mouth: Mucous membranes are moist.      Pharynx: Oropharynx is clear.   Eyes:      Extraocular Movements: Extraocular movements intact.      Conjunctiva/sclera: Conjunctivae normal.      Pupils: Pupils are equal, round, and reactive to light.   Cardiovascular:      PMI at left midclavicular line. Normal rate. Regular rhythm. Normal S1. Normal S2.     "   Murmurs: There is no murmur.      No gallop.  No click. No rub.       No audible carotid bruit     No lower extremity edema on exam  Pulmonary:      Effort: Pulmonary effort is normal.      Breath sounds: Normal breath sounds.   Abdominal:      General: Abdomen is flat. Bowel sounds are normal.      Palpations: Abdomen is soft and non-tender  Musculoskeletal:      Cervical back: Normal range of motion and neck supple.   Skin:     General: Skin is warm and dry.      Capillary Refill: Capillary refill takes less than 2 seconds.   Neurological:      General: No focal deficit present.      Mental Status: She is alert and oriented to person, place, and time. Mental status is at baseline.   Psychiatric:         Mood and Affect: Mood normal.         Behavior: Behavior normal.         Thought Content: Thought content normal.         Judgment: Judgment normal.     Vitals and nursing note reviewed. Physical exam within normal limits.     DASI Risk Score      Flowsheet Row Pre-Admission Testing from 2/19/2025 in [facility] Questionnaire Series Submission from 12/30/2024 in [facility] with Generic Provider Evelyn   Can you take care of yourself (eat, dress, bathe, or use toilet)?  2.75 filed at 02/19/2025 0834 2.75  filed at 12/30/2024 1109   Can you walk indoors, such as around your house? 1.75 filed at 02/19/2025 0834 1.75  filed at 12/30/2024 1109   Can you walk a block or two on level ground?  2.75 filed at 02/19/2025 0834 2.75  filed at 12/30/2024 1109   Can you climb a flight of stairs or walk up a hill? 5.5 filed at 02/19/2025 0834 5.5  filed at 12/30/2024 1109   Can you run a short distance? 8 filed at 02/19/2025 0834 8  filed at 12/30/2024 1109   Can you do light work around the house like dusting or washing dishes? 2.7 filed at 02/19/2025 0834 2.7  filed at 12/30/2024 1109   Can you do moderate work around the house like vacuuming, sweeping floors or carrying groceries? 3.5 filed at 02/19/2025  0834 3.5  filed at 12/30/2024 1109   Can you do heavy work around the house like scrubbing floors or lifting and moving heavy furniture?  8 filed at 02/19/2025 0834 8  filed at 12/30/2024 1109   Can you do yard work like raking leaves, weeding or pushing a mower? 4.5 filed at 02/19/2025 0834 4.5  filed at 12/30/2024 1109   Can you have sexual relations? 5.25 filed at 02/19/2025 0834 5.25  filed at 12/30/2024 1109   Can you participate in moderate recreational activities like golf, bowling, dancing, doubles tennis or throwing a baseball or football? 6 filed at 02/19/2025 0834 6  filed at 12/30/2024 1109   Can you participate in strenous sports like swimming, singles tennis, football, basketball, or skiing? 7.5 filed at 02/19/2025 0834 7.5  filed at 12/30/2024 1109   DASI SCORE 58.2 filed at 02/19/2025 0834 58.2  filed at 12/30/2024 1109   METS Score (Will be calculated only when all the questions are answered) 9.9 filed at 02/19/2025 0834 9.9  filed at 12/30/2024 1109          Caprini DVT Assessment      Flowsheet Row Pre-Admission Testing from 2/19/2025 in [facility]   Surgical Factors Major surgery planned, lasting 2-3 hours filed at 02/19/2025 0833          Modified Frailty Index    No data to display       CHADS2 Stroke Risk  Current as of 30 minutes ago        N/A 3 to 100%: High Risk   2 to < 3%: Medium Risk   0 to < 2%: Low Risk     Last Change: N/A          This score determines the patient's risk of having a stroke if the patient has atrial fibrillation.        This score is not applicable to this patient. Components are not calculated.          Revised Cardiac Risk Index      Flowsheet Row Pre-Admission Testing from 2/19/2025 in [facility]   High-Risk Surgery (Intraperitoneal, Intrathoracic,Suprainguinal vascular) 0 filed at 02/19/2025 0834   History of ischemic heart disease (History of MI, History of positive exercuse test, Current chest paint considered due to myocardial  ischemia, Use of nitrate therapy, ECG with pathological Q Waves) 0 filed at 02/19/2025 0834   History of congestive heart failure (pulmonary edemia, bilateral rales or S3 gallop, Paroxysmal nocturnal dyspnea, CXR showing pulmonary vascular redistribution) 0 filed at 02/19/2025 0834   History of cerebrovascular disease (Prior TIA or stroke) 0 filed at 02/19/2025 0834   Pre-operative insulin treatment 0 filed at 02/19/2025 0834   Pre-operative creatinine>2 mg/dl 0 filed at 02/19/2025 0834   Revised Cardiac Risk Calculator 0 filed at 02/19/2025 0834          Apfel Simplified Score    No data to display       Risk Analysis Index Results This Encounter    No data found in the last 10 encounters.       Stop Bang Score      Flowsheet Row Pre-Admission Testing from 2/19/2025 in [facility] Questionnaire Series Submission from 12/30/2024 in [facility] with Generic Provider Evelyn   Do you snore loudly? 0 filed at 02/19/2025 0748 0  filed at 12/30/2024 1109   Do you often feel tired or fatigued after your sleep? 0 filed at 02/19/2025 0748 0  filed at 12/30/2024 1109   Has anyone ever observed you stop breathing in your sleep? 1 filed at 02/19/2025 0748 0  filed at 12/30/2024 1109   Do you have or are you being treated for high blood pressure? 0 filed at 02/19/2025 0748 1  filed at 12/30/2024 1109   Recent BMI (Calculated) 28.6 filed at 02/19/2025 0748 27.8  filed at 12/30/2024 1109   Is BMI greater than 35 kg/m2? 0=No filed at 02/19/2025 0748 0=No  filed at 12/30/2024 1109   Age older than 50 years old? 1=Yes filed at 02/19/2025 0748 1=Yes  filed at 12/30/2024 1109   Is your neck circumference greater than 17 inches (Male) or 16 inches (Female)? 0 filed at 02/19/2025 0748 --   Gender - Male 0=No filed at 02/19/2025 0748 0=No  filed at 12/30/2024 1109   STOP-BANG Total Score 2 filed at 02/19/2025 0748 --          Prodigy: High Risk  Total Score: 8              Prodigy Age Score           ARISCAT Score for  Postoperative Pulmonary Complications    No data to display       Yessica Perioperative Risk for Myocardial Infarction or Cardiac Arrest (DAYNE)      Flowsheet Row Pre-Admission Testing from 2/19/2025 in [facility]   Calculated Age Score 1.32 filed at 02/19/2025 0834   Functional Status  0 filed at 02/19/2025 0834   ASA Class  -3.29 filed at 02/19/2025 0834   Creatinine 0 filed at 02/19/2025 0834   Type of Procedure  0.80 filed at 02/19/2025 0834   DAYNE Total Score  -6.42 filed at 02/19/2025 0834   DAYNE % 0.16 filed at 02/19/2025 0834            Assessment & Plan:    Neuro:  Depression(citalopram, ativan).     HEENT/Airway:  Pt has JOSUÉ, currently using a cpap   STOP-BANG Score-2 points lowrisk for JOSUÉ    Mallampati::  II    TM distance::  >3 FB    Neck ROM::  Full  Dentures-denies  Crowns-reports x 1  Implants-denies    Cardiovascular:  HTN, HLD (atorvastatin, hydrochlorothiazide)  METS: 9.9  RCRI: 0 points, 3.9%  risk for postoperative MACE   DAYNE: 0.16% risk for postoperative MACE  EKG -normal sinus rhythm Rate- No acute changes.     Pulmonary:  No diagnosis or significant findings on chart review or clinical presentation and evaluation.   ARISCAT: <26 points, 1.6% risk of in-hospital postoperative pulmonary complication  PRODIGY: Low risk for opioid induced respiratory depression  Smoking History-She has never smoked.  Discussed smoking cessation and deep breathing handout given    Renal/Urinary:  No diagnosis or significant findings on chart review or clinical presentation and evaluation, however, the patient is at increased risk of perioperative renal complications secondary to HTN. Preventative measures include BP monitoring, medication compliance, and hydration management.   CMP  Creatinine-0.68  GFR-90    Endocrine:  No diagnosis or significant findings on chart review or clinical presentation and evaluation.   HIH4M-9.3    Hematologic/Immunology:  No diagnosis or significant findings on chart  review or clinical presentation and evaluation.  The patient is not a Jehovah’s witness and will accept blood and blood products if medically indicated.   History of previous blood transfusions No  CBC  H/H-12.2/37/7  Caprini Score 3, patient at Low for postoperative DVT. Pt supplied education/VTE handout  Anticoagulation use: No     Gastrointestinal:   GERD (famotidine)  Recreational drug use: none  Alcohol use 1 glasses of wine per month    Infectious disease:   No diagnosis or significant findings on chart review or clinical presentation and evaluation.   Prescription provided for CHG body wash and dental rinse. CHG use instructions reviewed and provided to patient.  Staph screen collected-negative    Musculoskeletal:   No diagnosis or significant findings on chart review or clinical presentation and evaluation.   JHFRAT score- 1 points. low risk for falls    Anesthesia:  ASA 2 - Patient with mild systemic disease with no functional limitations  Anticipated anesthesia-spinal  History of General anesthesia- yes  Complications- PONV  No family history of anesthesia complications    Abnormalities noted on PAT evaluation: No    Labs & Imaging ordered:  A1C, MRSA, EKg  Nickel/metal allergy-negative  Shellfish allergy-negative    Discussed with patient medication instructions, NPO guidelines, and any questions or concerns.      time spent 45 minutes

## 2025-02-20 LAB
EST. AVERAGE GLUCOSE BLD GHB EST-MCNC: 105 MG/DL
HBA1C MFR BLD: 5.3 %

## 2025-02-21 LAB — STAPHYLOCOCCUS SPEC CULT: NORMAL

## 2025-03-04 PROBLEM — Z96.651 TOTAL KNEE REPLACEMENT STATUS, RIGHT: Status: ACTIVE | Noted: 2025-03-04

## 2025-03-04 RX ORDER — OXYCODONE HYDROCHLORIDE 5 MG/1
5 TABLET ORAL EVERY 6 HOURS PRN
Qty: 28 TABLET | Refills: 0 | Status: SHIPPED | OUTPATIENT
Start: 2025-03-04 | End: 2025-03-12

## 2025-03-04 RX ORDER — DOCUSATE SODIUM 100 MG/1
100 CAPSULE, LIQUID FILLED ORAL 2 TIMES DAILY
Qty: 60 CAPSULE | Refills: 0 | Status: SHIPPED | OUTPATIENT
Start: 2025-03-04 | End: 2025-04-04

## 2025-03-04 RX ORDER — MELOXICAM 15 MG/1
15 TABLET ORAL DAILY
Qty: 30 TABLET | Refills: 0 | Status: SHIPPED | OUTPATIENT
Start: 2025-03-04 | End: 2025-04-04

## 2025-03-04 RX ORDER — PANTOPRAZOLE SODIUM 40 MG/1
40 TABLET, DELAYED RELEASE ORAL DAILY
Qty: 30 TABLET | Refills: 0 | Status: SHIPPED | OUTPATIENT
Start: 2025-03-04 | End: 2025-04-04

## 2025-03-04 RX ORDER — POLYETHYLENE GLYCOL 3350 17 G/17G
17 POWDER, FOR SOLUTION ORAL DAILY
Qty: 238 G | Refills: 0 | Status: SHIPPED | OUTPATIENT
Start: 2025-03-04

## 2025-03-04 RX ORDER — ASPIRIN 81 MG/1
81 TABLET ORAL 2 TIMES DAILY
Qty: 60 TABLET | Refills: 0 | Status: SHIPPED | OUTPATIENT
Start: 2025-03-04 | End: 2025-04-04

## 2025-03-04 RX ORDER — TRAMADOL HYDROCHLORIDE 50 MG/1
50 TABLET ORAL EVERY 6 HOURS PRN
Qty: 28 TABLET | Refills: 0 | Status: SHIPPED | OUTPATIENT
Start: 2025-03-04 | End: 2025-03-12

## 2025-03-04 RX ORDER — ACETAMINOPHEN 500 MG
1000 TABLET ORAL EVERY 6 HOURS PRN
Qty: 240 TABLET | Refills: 0 | Status: SHIPPED | OUTPATIENT
Start: 2025-03-04 | End: 2025-04-04

## 2025-03-04 NOTE — DISCHARGE INSTRUCTIONS
MD Jeannie Martinez MPAS, CORDELL, ATC  Adult Reconstruction and Joint Replacement Surgery  Phone: [phone]     Fax: [phone]        DISCHARGE INSTRUCTIONS      PLEASE READ CAREFULLY BEFORE CONTACTING YOUR PROVIDER.    WE WORK COLLABORATIVELY AS A TEAM. CALLING MULTIPLE STAFF MEMBERS REGARDING THE SAME ISSUE WILL DELAY YOUR CARE.    [employer] IS THE PREFERRED COMMUNICATION FOR ALL TEAM MEMBERS.    POSTOPERATIVE INSTRUCTIONS: TOTAL HIP & TOTAL KNEE ARTHROPLASTY    JOINT CARE TEAM  Please use the information below to contact your care team following surgery.  If you are leaving a message or using the [employer] portal, please include your full name, date of birth and date of surgery so that we can correctly identify you.  Your call will be returned within 1-2 business days, please do not leave multiple messages with other staff regarding a single issue while you are awaiting a return call.     Who to call Contact Information Matters needing handled   Jeannie Reich PA-C  Physician Assistant [employer] [facility]   Medical questions/concerns       Ramon Peter-    [email or web address]           [phone]  opt. 2    [phone] fax  [phone]         Scheduling office Visits  Medical questions/concerns  Leave of Absence or other paperwork  Any concerns more than 6 weeks from surgery - an appointment will need to be made   Enedelia Felton MBA, BSN, RN-BC  Ortho Nurse Navigator [place]        __________________________________    Jay Padgett RN, BSN  Ortho Coordinator [facility] LIZAMAN-BC  Ortho Nurse Navigator [facility]       [phone] Please call staff at the institution in which you had surgery for prescription refills        Prescription Refills  Nursing, medical question related questions or concerns within 6 weeks of surgery   Orders for Outpatient Physical Therapy             MEDICATION  REFILLS - MyChart or Nurse Navigator (Above) at the institution in which you had surgery. [facility] [place] or [facility].    -You will NOT receive a call indicating that your prescription has been filled.  Please contact your pharmacy with any questions.    Medication refills will be filled Monday-Friday 7am to 1pm ONLY. Please call the nurse navigator office or send a [employer]t message for a refill request.  Any requests received outside of this timeframe will be handled on the next business day.  Please do not call multiple times or call other members of the care team for medication needs, this will cause the refill to take longer.    Per State and Institutional policy, pain medications can only be refilled every 7 days for up to six weeks following surgery.    My Chart Portal: If you are using the My Chart portal and are requesting a medication refill, please list what type of surgery you had and left or right side, medication that needs refilled, and pharmacy you would like your medication sent.     WEIGHT BEARING- weight bearing as tolerated to operative extremity     ACTIVITY-As Tolerated    DRIVING & TRAVEL AFTER SURGERY   Patients should anticipate waiting at least 4-6 weeks before traveling long distances after surgery.  You will need to stop to walk around ever 1 hour during your travel to help with blood clot prevention.    Patients may not drive until cleared by the joint nurse or the office and you are off of all narcotics.    DENTAL PROCEDURES & CLEANINGS  You must wait a minimum of 3 months for elective dental appointments after a total joint replacement, including routine cleanings or dental work including bridges, crowns, extractions, etc.. Unless, it is an emergency. You will need a prophylactic antibiotic lifelong prior to any dental visit, cleaning or procedure. Your surgeon's office or your dentist may provide a prescription antibiotic. Antibiotics are a lifelong need before dental appointments.      You  do not need antibiotics for endoscopic procedures such as colonoscopy or EGD, dermatologic biopsies or eye surgeries.    WOUND CARE  If you experience continued drainage or bleeding, you may cover with abdominal/Maxi pads (purchase at local drug store).  Knee replacements should wrap with an ace wrap.  You may shower with waterproof dressing on. Your surgical bandage will be removed by your home therapist 1 week after surgery. If you have staples intact, home care will remove in 2 weeks. If you have sutures intact, you will need to return to the office in 2 weeks for suture removal. Once the dressing is removed by home care, you may continue to shower. Let soap and water wash over the wound. DO NOT SCRUB.  Steri-strips under the bandage will remain in place until they fall off on their own.  If they are loose, you may gently remove.  If they have not fallen off in two weeks, gently peel them off. Do not remove if pulling causes resistance against the incision.  You will see suture tails sticking out of the ends of the incision.  DO NOT CUT THEM.  They will fall off when the sutures dissolve.  If they are bothersome, cover with a band aid.  Do not soak in a bath tub, hot tub, pool or lake until you are 8 weeks out from surgery.  Do not apply lotions, creams or ointments until you are 6 weeks out from surgery,    PAIN, SWELLING, BRUISING & CLICKING  Pain and swelling are a natural part of your recovery which is considered normal for up to a year after surgery.  Symptoms may be treated with movement, ice, compression stockings, elevating your leg, and by following the pain medication regimen as prescribed.  Bruising is normal for several weeks after surgery. You may also have leg swelling and pain in your shin.  You may ice areas that are tender to help with discomfort.  You are required to wear the provided compression stockings, every day, for 4 weeks following surgery.  Remove the stockings at night and place them  back on in the morning.  Pain and swelling may temporarily increase with an increase in activity or exercise.  Use ice after activity.  Audible clicking with movement or exercises is considered normal following joint replacement.  If this persists at 6 months or 1 year, please notify your surgeon.  You may also feel decreased sensation or numbness near the incision site.  This is normal and sensation may or may not return.    PERSONAL HYGIENE  You may shower upon discharging from the hospital.  Soap and water is permitted to run over the surgical dressing, steri-strips and incision.  Do not scrub directly over these items.  DO NOT soak your incision in a bath, hot tub, pool or pond/lake for a minimum of 8 weeks following your surgery.  DO NOT use lotions, creams, ointments on your wound for a minimum of 6 weeks following your surgery. At that time you may use vitamin E to assist with softening of your incision.      RESTARTING HOME ROUTINE - DIET & MEDICATIONS  Post-operative constipation can result due to a combination of inactivity, anesthesia and pain medication. To help prevent this, you should increase your water and fiber intake. Physical activity such as walking will also help stimulate the bowels.   You may resume your normal diet when you discharge home.    To avoid constipation, choose foods that help promote good bowel habits, such as foods high in fiber.  You may restart your home medications the following day after your surgery UNLESS you have been given alternate instructions.  Follow the instructions given to you on your hospital discharge instructions for more information regarding your home medications.  IF YOU EXPERIENCE NAUSEA OR DIARRHEA, FOLLOW THE B.R.A.T. DIET UNTIL SYMPTOMS RESOLVE.  If you are experiencing vomiting that lasts more than 24 hours, please contact your joint nurse.      IN-HOME PHYSICAL THERAPY & OUTPATIENT PHYSICAL THERAPY  In-home physical therapy will start 1-2 days after you  get home from the hospital.    The home care agency will call within the first 24-48 hours to set up their first visit.  Please do not call your care team to inquire during this timeframe.  Continue the exercises you were given in the hospital until you have been seen by in-home therapy.  Make sure to provide a phone number with the ability for the home care staff to leave a message if you do not answer your phone.    Outpatient physical therapy following knee replacement surgery should begin 2-3 weeks after surgery.  You will be given physical therapy order prior to discharge from the hospital. You should call to schedule this appointment ASAP if not already scheduled before surgery.  Waiting until you are ready for outpatient physical therapy will cause a delay in your care.  You may choose any outpatient physical therapy location.      EMERGENCIES - WHEN TO CONTACT THE SURGEON'S OFFICE IMMEDIATELY  Fever >101 with chills that has been present for at least 48 hours.   Excessive bleeding from incision that will not slow down. A small amount of drainage is normal and expected.  Once pressure is applied and the area is covered, do not continue to check the area regularly.  This will remove pressure and bleeding will continue.  Leave in place for 4-6 hours.  Signs of infection of incision-excessive drainage that is soaking through your dressing (especially if it is pus-like), redness that is spreading out from the edges of your incision, or increased warmth around the area.  Excruciating pain for which the pain medication, taken as instructed, is not helping.  Severe calf pain.  Go directly to the emergency room or call 911, if you are experiencing chest pain or difficulty breathing.    After Hour and Weekend Emergency Answering Service [phone]    ICE & COLD THERAPY INSTRUCTIONS    To assist with pain control and post-op swelling, you should be using ice regularly throughout recovery, especially for the first 6  weeks, regardless of the cold therapy method you use.      Always make sure there is a layer of protection between the cold pad and your skin.    If you are using ICE PACKS or GEL PACKS, you will need to alternate 20 minutes on, 20 minutes off twice per hour.    If you are using an ICE MACHINE, please follow the provided ice machine instructions.  These devices differ from ice or ice packs whereas the mechanism circulates water through tubing and a pad to provide longer periods of cold therapy to the desired site.  You can use your cold devices around the clock for optimal comfort.  We recommend using cold therapy after working with therapy or completing exercises on your own.  There is no set schedule in which you must follow while using cold therapy.  Below are a few points to remember when using a cold therapy device:    You do not need to need to use the 20 on, 20 off method.  Detach the pad from the cooler and ambulate at least once every hour.  You can check your skin under the pad at this time.  You may wear the cold therapy device during periods of sleep including overnight.  If you wake up during the night, you can check the skin at this time.  You do not need to wake up specifically to perform skin checks.  Empty the cooler and pad when device is not in use.  Follow 's instructions for cleaning your cold therapy device.    DISCHARGE MEDICATIONS - Please reference the sample schedule on the reverse side for instructions on how to best schedule medications.    PAIN MEDICATION    ___X_ Tramadol / Oxycodone  Tramadol and Oxycodone have been prescribed for post-operative pain control.    These medications will only be refilled ONCE every 7 days for a period of up to 6 weeks following surgery.  After 6 weeks, you will transition to acetaminophen and over -the- counter anti-inflammatories such as Ibuprofen, Advil or Aleve in conjunction with ICE/COLD THERAPY.   Side effects may be constipation and  nausea, vomiting, sleepiness, dizziness, lightheadedness, headache, blurred vision, dry mouth sweating, itching (if you have itching, over-the -counter Benadryl can be used as needed).  You may NOT operate a motor vehicle while taking these medications or have been cleared by your care team.     ___X_ Acetaminophen (Tylenol)  Acetaminophen has been prescribed as an adjunct for pain control. Take two 500 mg tablets every 6 hours for 4 weeks. You will not receive a refill on this medication.  Do not exceed 4000mg of acetaminophen within a 24 hour period.  Side effects may include nausea, heartburn, drowsiness, and headache.    ___X_ Meloxicam (Mobic)-Meloxicam has been prescribed as an adjunct anti-inflammatory to assist in pain control.    Take one 15mg tablet once daily for 4 weeks.  You will not receive refills on this medication.   Side effects may include nausea.  May not be prescribed if you are on a more potent blood thinner than aspirin or have chronic kidney disease.    BLOOD THINNER    ___X_ Blood Thinner   A blood thinner has been prescribed to prevent blood clots in your leg or lungs. Take as prescribed on the bottle for 4 weeks. You will not receive a refill on this medication.    ANTI NAUSEA    ___X_ Proton Pump Inhibitor (PPI)-Stomach Acid Reduction Medication  If you are already on a PPI, you will continue your regular medication. If you are not, you will be prescribed Pantoprazole to help with nausea and protect your stomach while taking pain medication.  You will not receive a refill on this medication.    STOOL SOFTENERS    ___X_ Colace (Docusate Sodium) & Miralax (polyethylene glycol)  Take both medications to help with constipation while using the Oxycodone and Tramadol for pain control.  You will not receive a refill on this medication.    Continued Constipation  If you continue to be constipated despite daily use of Miralax and Colace, you try an over-the-counter Dulcolax Suppository and use per  instructions on the package.       SPECIAL INSTRUCTIONS   none    You will not receive refills on the following medications.   Acetaminophen (Tylenol  Meloxicam  Miralax  Colace  Proton Pump Inhibitor (PPI)  Blood Thinner    Pain Medication Refills - Ortho Nurse Navigator or [facility]- Monday through Friday 7am-1pm    FOLLOW-UP- You should have an appointment with either Dr. Lim or KEITH Shine in 6 weeks.         SAMPLE              The times below are an example of how to organize medications to optimize pain control  Your actual medication schedule may vary based on your last dose taken IN THE HOSPITAL      Time 3:00 am 6:00 am 9:00 am 12:00 pm 3:00 pm 6:00 pm 9:00 pm 12:00 am   Medications Tramadol Tylenol  Oxycodone  Miralax   Blood Thinner  Colace  Pantoprazole or other PPI  Tramadol  Meloxicam Tylenol  Oxycodone Tramadol Tylenol  Oxycodone  Miralax Blood Thinner  Colace  Tramadol   Tylenol  Oxycodone            You may begin to wean off the pain medication as your pain remains controlled with increased activity.  The schedules provided are meant to serve as an example.  You may wean off based on your pain control.  Please note that pain medications are not filled beyond 6 weeks after surgery.              The times below are an example of how to WEAN OFF medications WHILE CONTINUING TO OPTIMIZE PAIN CONTROL.  Your actual medication schedule may vary based on your last dose taken.    Time 12:00am 4:00am 8:00am 12:00pm 4:00pm 8:00pm   Med Tramadol Oxycodone   Tramadol Oxycodone Tramadol Oxycodone     Time 12:00am 6:00am 12:00pm 6:00pm   Med Tramadol Oxycodone   Tramadol Oxycodone     Time 12:00am 8:00am 4:00pm   Med Tramadol Oxycodone   Tramadol     Time 12:00am 12:00pm   Med Tramadol Tramadol         TOTAL KNEE REPLACEMENT PATIENTS SHOULD TRANSITION TO OUTPATIENT PHYSICAL THERAPY NO MORE THAN 3 WEEKS FOLLOWING SURGERY.  PLEASE SEE THE LIST OF  FACILITIES BELOW.  CALL TO SCHEDULE YOUR FIRST  APPOINTMENT BEFORE YOU HAVE YOUR SURGERY.

## 2025-03-05 ENCOUNTER — PHARMACY VISIT (OUTPATIENT)
Dept: PHARMACY | Facility: CLINIC | Age: 65
End: 2025-03-05
Payer: COMMERCIAL

## 2025-03-05 ENCOUNTER — HOME HEALTH ADMISSION (OUTPATIENT)
Dept: HOME HEALTH SERVICES | Facility: HOME HEALTH | Age: 65
End: 2025-03-05
Payer: MEDICARE

## 2025-03-05 ENCOUNTER — HOSPITAL ENCOUNTER (OUTPATIENT)
Facility: HOSPITAL | Age: 65
Setting detail: OUTPATIENT SURGERY
Discharge: HOME HEALTH | End: 2025-03-05
Attending: ORTHOPAEDIC SURGERY | Admitting: ORTHOPAEDIC SURGERY
Payer: MEDICARE

## 2025-03-05 ENCOUNTER — ANESTHESIA EVENT (OUTPATIENT)
Dept: OPERATING ROOM | Facility: HOSPITAL | Age: 65
End: 2025-03-05
Payer: MEDICARE

## 2025-03-05 ENCOUNTER — DOCUMENTATION (OUTPATIENT)
Dept: HOME HEALTH SERVICES | Facility: HOME HEALTH | Age: 65
End: 2025-03-05

## 2025-03-05 ENCOUNTER — ANESTHESIA (OUTPATIENT)
Dept: OPERATING ROOM | Facility: HOSPITAL | Age: 65
End: 2025-03-05
Payer: MEDICARE

## 2025-03-05 VITALS
RESPIRATION RATE: 16 BRPM | DIASTOLIC BLOOD PRESSURE: 73 MMHG | OXYGEN SATURATION: 95 % | HEIGHT: 64 IN | BODY MASS INDEX: 28.68 KG/M2 | SYSTOLIC BLOOD PRESSURE: 113 MMHG | HEART RATE: 77 BPM | TEMPERATURE: 97.5 F | WEIGHT: 168 LBS

## 2025-03-05 DIAGNOSIS — M17.11 PRIMARY OSTEOARTHRITIS OF RIGHT KNEE: ICD-10-CM

## 2025-03-05 DIAGNOSIS — M17.11 UNILATERAL PRIMARY OSTEOARTHRITIS, RIGHT KNEE: ICD-10-CM

## 2025-03-05 DIAGNOSIS — Z96.651 TOTAL KNEE REPLACEMENT STATUS, RIGHT: Primary | ICD-10-CM

## 2025-03-05 PROCEDURE — A4649 SURGICAL SUPPLIES: HCPCS | Performed by: ORTHOPAEDIC SURGERY

## 2025-03-05 PROCEDURE — 97110 THERAPEUTIC EXERCISES: CPT | Mod: GP

## 2025-03-05 PROCEDURE — 7100000002 HC RECOVERY ROOM TIME - EACH INCREMENTAL 1 MINUTE: Performed by: ORTHOPAEDIC SURGERY

## 2025-03-05 PROCEDURE — 3600000010 HC OR TIME - EACH INCREMENTAL 1 MINUTE - PROCEDURE LEVEL FIVE: Performed by: ORTHOPAEDIC SURGERY

## 2025-03-05 PROCEDURE — 3700000001 HC GENERAL ANESTHESIA TIME - INITIAL BASE CHARGE: Performed by: ORTHOPAEDIC SURGERY

## 2025-03-05 PROCEDURE — 2500000004 HC RX 250 GENERAL PHARMACY W/ HCPCS (ALT 636 FOR OP/ED): Performed by: ANESTHESIOLOGY

## 2025-03-05 PROCEDURE — 2500000005 HC RX 250 GENERAL PHARMACY W/O HCPCS: Performed by: ANESTHESIOLOGIST ASSISTANT

## 2025-03-05 PROCEDURE — A27447 PR TOTAL KNEE ARTHROPLASTY: Performed by: ANESTHESIOLOGY

## 2025-03-05 PROCEDURE — 2500000004 HC RX 250 GENERAL PHARMACY W/ HCPCS (ALT 636 FOR OP/ED): Performed by: ANESTHESIOLOGIST ASSISTANT

## 2025-03-05 PROCEDURE — 2500000004 HC RX 250 GENERAL PHARMACY W/ HCPCS (ALT 636 FOR OP/ED): Performed by: PHYSICIAN ASSISTANT

## 2025-03-05 PROCEDURE — 2500000004 HC RX 250 GENERAL PHARMACY W/ HCPCS (ALT 636 FOR OP/ED): Performed by: ORTHOPAEDIC SURGERY

## 2025-03-05 PROCEDURE — 7100000011 HC EXTENDED STAY RECOVERY HOURLY - NURSING UNIT

## 2025-03-05 PROCEDURE — 2500000005 HC RX 250 GENERAL PHARMACY W/O HCPCS: Performed by: PHYSICIAN ASSISTANT

## 2025-03-05 PROCEDURE — 97530 THERAPEUTIC ACTIVITIES: CPT | Mod: GP

## 2025-03-05 PROCEDURE — 7100000001 HC RECOVERY ROOM TIME - INITIAL BASE CHARGE: Performed by: ORTHOPAEDIC SURGERY

## 2025-03-05 PROCEDURE — 2500000004 HC RX 250 GENERAL PHARMACY W/ HCPCS (ALT 636 FOR OP/ED)

## 2025-03-05 PROCEDURE — 27447 TOTAL KNEE ARTHROPLASTY: CPT | Performed by: ORTHOPAEDIC SURGERY

## 2025-03-05 PROCEDURE — 2720000007 HC OR 272 NO HCPCS: Performed by: ORTHOPAEDIC SURGERY

## 2025-03-05 PROCEDURE — 3700000002 HC GENERAL ANESTHESIA TIME - EACH INCREMENTAL 1 MINUTE: Performed by: ORTHOPAEDIC SURGERY

## 2025-03-05 PROCEDURE — 3600000005 HC OR TIME - INITIAL BASE CHARGE - PROCEDURE LEVEL FIVE: Performed by: ORTHOPAEDIC SURGERY

## 2025-03-05 PROCEDURE — 2500000004 HC RX 250 GENERAL PHARMACY W/ HCPCS (ALT 636 FOR OP/ED): Performed by: STUDENT IN AN ORGANIZED HEALTH CARE EDUCATION/TRAINING PROGRAM

## 2025-03-05 PROCEDURE — 97161 PT EVAL LOW COMPLEX 20 MIN: CPT | Mod: GP

## 2025-03-05 PROCEDURE — C1713 ANCHOR/SCREW BN/BN,TIS/BN: HCPCS | Performed by: ORTHOPAEDIC SURGERY

## 2025-03-05 PROCEDURE — 7100000009 HC PHASE TWO TIME - INITIAL BASE CHARGE: Performed by: ORTHOPAEDIC SURGERY

## 2025-03-05 PROCEDURE — C1776 JOINT DEVICE (IMPLANTABLE): HCPCS | Performed by: ORTHOPAEDIC SURGERY

## 2025-03-05 PROCEDURE — A27447 PR TOTAL KNEE ARTHROPLASTY: Performed by: ANESTHESIOLOGIST ASSISTANT

## 2025-03-05 PROCEDURE — 7100000010 HC PHASE TWO TIME - EACH INCREMENTAL 1 MINUTE: Performed by: ORTHOPAEDIC SURGERY

## 2025-03-05 PROCEDURE — 2500000001 HC RX 250 WO HCPCS SELF ADMINISTERED DRUGS (ALT 637 FOR MEDICARE OP)

## 2025-03-05 PROCEDURE — 2500000001 HC RX 250 WO HCPCS SELF ADMINISTERED DRUGS (ALT 637 FOR MEDICARE OP): Performed by: PHYSICIAN ASSISTANT

## 2025-03-05 PROCEDURE — 2780000003 HC OR 278 NO HCPCS: Performed by: ORTHOPAEDIC SURGERY

## 2025-03-05 PROCEDURE — 64447 NJX AA&/STRD FEMORAL NRV IMG: CPT | Performed by: STUDENT IN AN ORGANIZED HEALTH CARE EDUCATION/TRAINING PROGRAM

## 2025-03-05 PROCEDURE — 97116 GAIT TRAINING THERAPY: CPT | Mod: GP

## 2025-03-05 PROCEDURE — RXMED WILLOW AMBULATORY MEDICATION CHARGE

## 2025-03-05 DEVICE — ATTUNE PATELLA MEDIALIZED DOME 35MM CEMENTED AOX
Type: IMPLANTABLE DEVICE | Site: KNEE | Status: FUNCTIONAL
Brand: ATTUNE

## 2025-03-05 DEVICE — ATTUNE KNEE SYSTEM FEMORAL POSTERIOR STABILIZED NARROW SIZE 5N RIGHT CEMENTED
Type: IMPLANTABLE DEVICE | Site: KNEE | Status: FUNCTIONAL
Brand: ATTUNE

## 2025-03-05 DEVICE — SMARTSET HV HIGH VISCOSITY BONE CEMENT 40G
Type: IMPLANTABLE DEVICE | Site: KNEE | Status: FUNCTIONAL
Brand: SMARTSET

## 2025-03-05 DEVICE — ATTUNE KNEE SYSTEM TIBIAL INSERT FIXED BEARING POSTERIOR STABILIZED 5 8MM AOX
Type: IMPLANTABLE DEVICE | Site: KNEE | Status: FUNCTIONAL
Brand: ATTUNE

## 2025-03-05 RX ORDER — SODIUM CHLORIDE, SODIUM LACTATE, POTASSIUM CHLORIDE, CALCIUM CHLORIDE 600; 310; 30; 20 MG/100ML; MG/100ML; MG/100ML; MG/100ML
INJECTION, SOLUTION INTRAVENOUS CONTINUOUS PRN
Status: DISCONTINUED | OUTPATIENT
Start: 2025-03-05 | End: 2025-03-05

## 2025-03-05 RX ORDER — CYCLOBENZAPRINE HCL 10 MG
5 TABLET ORAL 3 TIMES DAILY PRN
Status: DISCONTINUED | OUTPATIENT
Start: 2025-03-05 | End: 2025-03-05 | Stop reason: HOSPADM

## 2025-03-05 RX ORDER — NALOXONE HYDROCHLORIDE 0.4 MG/ML
0.2 INJECTION, SOLUTION INTRAMUSCULAR; INTRAVENOUS; SUBCUTANEOUS EVERY 5 MIN PRN
Status: DISCONTINUED | OUTPATIENT
Start: 2025-03-05 | End: 2025-03-05 | Stop reason: HOSPADM

## 2025-03-05 RX ORDER — BISACODYL 10 MG/1
10 SUPPOSITORY RECTAL DAILY PRN
Status: DISCONTINUED | OUTPATIENT
Start: 2025-03-05 | End: 2025-03-05 | Stop reason: HOSPADM

## 2025-03-05 RX ORDER — OXYCODONE HYDROCHLORIDE 5 MG/1
10 TABLET ORAL EVERY 4 HOURS PRN
Status: DISCONTINUED | OUTPATIENT
Start: 2025-03-05 | End: 2025-03-05 | Stop reason: HOSPADM

## 2025-03-05 RX ORDER — PROPOFOL 10 MG/ML
INJECTION, EMULSION INTRAVENOUS AS NEEDED
Status: DISCONTINUED | OUTPATIENT
Start: 2025-03-05 | End: 2025-03-05

## 2025-03-05 RX ORDER — ROPIVACAINE/EPI/CLONIDINE/KET 2.46-0.005
SYRINGE (ML) INJECTION AS NEEDED
Status: DISCONTINUED | OUTPATIENT
Start: 2025-03-05 | End: 2025-03-05 | Stop reason: HOSPADM

## 2025-03-05 RX ORDER — KETOROLAC TROMETHAMINE 15 MG/ML
15 INJECTION, SOLUTION INTRAMUSCULAR; INTRAVENOUS EVERY 6 HOURS
Status: DISCONTINUED | OUTPATIENT
Start: 2025-03-05 | End: 2025-03-05 | Stop reason: HOSPADM

## 2025-03-05 RX ORDER — METOCLOPRAMIDE 10 MG/1
10 TABLET ORAL EVERY 6 HOURS PRN
Status: DISCONTINUED | OUTPATIENT
Start: 2025-03-05 | End: 2025-03-05 | Stop reason: HOSPADM

## 2025-03-05 RX ORDER — ALBUTEROL SULFATE 0.83 MG/ML
2.5 SOLUTION RESPIRATORY (INHALATION) ONCE AS NEEDED
Status: DISCONTINUED | OUTPATIENT
Start: 2025-03-05 | End: 2025-03-05 | Stop reason: HOSPADM

## 2025-03-05 RX ORDER — OXYCODONE HCL 10 MG/1
10 TABLET, FILM COATED, EXTENDED RELEASE ORAL ONCE
Status: COMPLETED | OUTPATIENT
Start: 2025-03-05 | End: 2025-03-05

## 2025-03-05 RX ORDER — MIDAZOLAM HYDROCHLORIDE 1 MG/ML
INJECTION, SOLUTION INTRAMUSCULAR; INTRAVENOUS AS NEEDED
Status: DISCONTINUED | OUTPATIENT
Start: 2025-03-05 | End: 2025-03-05

## 2025-03-05 RX ORDER — SODIUM CHLORIDE, SODIUM LACTATE, POTASSIUM CHLORIDE, CALCIUM CHLORIDE 600; 310; 30; 20 MG/100ML; MG/100ML; MG/100ML; MG/100ML
100 INJECTION, SOLUTION INTRAVENOUS CONTINUOUS
Status: DISCONTINUED | OUTPATIENT
Start: 2025-03-05 | End: 2025-03-05 | Stop reason: HOSPADM

## 2025-03-05 RX ORDER — ONDANSETRON HYDROCHLORIDE 2 MG/ML
4 INJECTION, SOLUTION INTRAVENOUS EVERY 8 HOURS PRN
Status: DISCONTINUED | OUTPATIENT
Start: 2025-03-05 | End: 2025-03-05 | Stop reason: HOSPADM

## 2025-03-05 RX ORDER — ACETAMINOPHEN 325 MG/1
975 TABLET ORAL ONCE
Status: COMPLETED | OUTPATIENT
Start: 2025-03-05 | End: 2025-03-05

## 2025-03-05 RX ORDER — CEFAZOLIN SODIUM 2 G/100ML
2 INJECTION, SOLUTION INTRAVENOUS ONCE
Status: COMPLETED | OUTPATIENT
Start: 2025-03-05 | End: 2025-03-05

## 2025-03-05 RX ORDER — SODIUM CHLORIDE, SODIUM LACTATE, POTASSIUM CHLORIDE, CALCIUM CHLORIDE 600; 310; 30; 20 MG/100ML; MG/100ML; MG/100ML; MG/100ML
50 INJECTION, SOLUTION INTRAVENOUS CONTINUOUS
Status: DISCONTINUED | OUTPATIENT
Start: 2025-03-05 | End: 2025-03-05 | Stop reason: HOSPADM

## 2025-03-05 RX ORDER — ASPIRIN 81 MG/1
81 TABLET ORAL 2 TIMES DAILY
Status: DISCONTINUED | OUTPATIENT
Start: 2025-03-05 | End: 2025-03-05 | Stop reason: HOSPADM

## 2025-03-05 RX ORDER — SCOPOLAMINE 1 MG/3D
1 PATCH, EXTENDED RELEASE TRANSDERMAL
Status: DISCONTINUED | OUTPATIENT
Start: 2025-03-05 | End: 2025-03-05 | Stop reason: HOSPADM

## 2025-03-05 RX ORDER — HYDROMORPHONE HYDROCHLORIDE 0.2 MG/ML
0.2 INJECTION INTRAMUSCULAR; INTRAVENOUS; SUBCUTANEOUS EVERY 5 MIN PRN
Status: DISCONTINUED | OUTPATIENT
Start: 2025-03-05 | End: 2025-03-05 | Stop reason: HOSPADM

## 2025-03-05 RX ORDER — CEFAZOLIN SODIUM 2 G/100ML
2 INJECTION, SOLUTION INTRAVENOUS EVERY 8 HOURS
Status: DISCONTINUED | OUTPATIENT
Start: 2025-03-05 | End: 2025-03-05 | Stop reason: HOSPADM

## 2025-03-05 RX ORDER — MELOXICAM 7.5 MG/1
7.5 TABLET ORAL ONCE
Status: COMPLETED | OUTPATIENT
Start: 2025-03-05 | End: 2025-03-05

## 2025-03-05 RX ORDER — METOCLOPRAMIDE HYDROCHLORIDE 5 MG/ML
10 INJECTION INTRAMUSCULAR; INTRAVENOUS EVERY 6 HOURS PRN
Status: DISCONTINUED | OUTPATIENT
Start: 2025-03-05 | End: 2025-03-05 | Stop reason: HOSPADM

## 2025-03-05 RX ORDER — HYDRALAZINE HYDROCHLORIDE 20 MG/ML
5 INJECTION INTRAMUSCULAR; INTRAVENOUS EVERY 30 MIN PRN
Status: DISCONTINUED | OUTPATIENT
Start: 2025-03-05 | End: 2025-03-05 | Stop reason: HOSPADM

## 2025-03-05 RX ORDER — BISACODYL 5 MG
10 TABLET, DELAYED RELEASE (ENTERIC COATED) ORAL DAILY PRN
Status: DISCONTINUED | OUTPATIENT
Start: 2025-03-05 | End: 2025-03-05 | Stop reason: HOSPADM

## 2025-03-05 RX ORDER — ACETAMINOPHEN 325 MG/1
650 TABLET ORAL EVERY 6 HOURS SCHEDULED
Status: DISCONTINUED | OUTPATIENT
Start: 2025-03-05 | End: 2025-03-05 | Stop reason: HOSPADM

## 2025-03-05 RX ORDER — DOCUSATE SODIUM 100 MG/1
100 CAPSULE, LIQUID FILLED ORAL 2 TIMES DAILY
Status: DISCONTINUED | OUTPATIENT
Start: 2025-03-05 | End: 2025-03-05 | Stop reason: HOSPADM

## 2025-03-05 RX ORDER — LIDOCAINE HYDROCHLORIDE 10 MG/ML
0.1 INJECTION, SOLUTION EPIDURAL; INFILTRATION; INTRACAUDAL; PERINEURAL ONCE
Status: DISCONTINUED | OUTPATIENT
Start: 2025-03-05 | End: 2025-03-05 | Stop reason: HOSPADM

## 2025-03-05 RX ORDER — OXYCODONE HYDROCHLORIDE 5 MG/1
5 TABLET ORAL EVERY 6 HOURS PRN
Status: DISCONTINUED | OUTPATIENT
Start: 2025-03-05 | End: 2025-03-05 | Stop reason: HOSPADM

## 2025-03-05 RX ORDER — LEVOTHYROXINE SODIUM 112 UG/1
112 TABLET ORAL
Status: DISCONTINUED | OUTPATIENT
Start: 2025-03-06 | End: 2025-03-05 | Stop reason: HOSPADM

## 2025-03-05 RX ORDER — ONDANSETRON HYDROCHLORIDE 2 MG/ML
4 INJECTION, SOLUTION INTRAVENOUS ONCE AS NEEDED
Status: DISCONTINUED | OUTPATIENT
Start: 2025-03-05 | End: 2025-03-05 | Stop reason: HOSPADM

## 2025-03-05 RX ORDER — FENTANYL CITRATE 50 UG/ML
50 INJECTION, SOLUTION INTRAMUSCULAR; INTRAVENOUS ONCE
Status: COMPLETED | OUTPATIENT
Start: 2025-03-05 | End: 2025-03-05

## 2025-03-05 RX ORDER — HYDROMORPHONE HYDROCHLORIDE 0.2 MG/ML
0.1 INJECTION INTRAMUSCULAR; INTRAVENOUS; SUBCUTANEOUS EVERY 5 MIN PRN
Status: DISCONTINUED | OUTPATIENT
Start: 2025-03-05 | End: 2025-03-05 | Stop reason: HOSPADM

## 2025-03-05 RX ORDER — POLYETHYLENE GLYCOL 3350 17 G/17G
17 POWDER, FOR SOLUTION ORAL DAILY
Status: DISCONTINUED | OUTPATIENT
Start: 2025-03-06 | End: 2025-03-05 | Stop reason: HOSPADM

## 2025-03-05 RX ORDER — PANTOPRAZOLE SODIUM 40 MG/1
40 TABLET, DELAYED RELEASE ORAL
Status: DISCONTINUED | OUTPATIENT
Start: 2025-03-06 | End: 2025-03-05 | Stop reason: HOSPADM

## 2025-03-05 RX ORDER — TRANEXAMIC ACID 100 MG/ML
INJECTION, SOLUTION INTRAVENOUS AS NEEDED
Status: DISCONTINUED | OUTPATIENT
Start: 2025-03-05 | End: 2025-03-05

## 2025-03-05 RX ORDER — ONDANSETRON HYDROCHLORIDE 2 MG/ML
INJECTION, SOLUTION INTRAVENOUS AS NEEDED
Status: DISCONTINUED | OUTPATIENT
Start: 2025-03-05 | End: 2025-03-05

## 2025-03-05 RX ORDER — CITALOPRAM 10 MG/1
20 TABLET ORAL DAILY
Status: DISCONTINUED | OUTPATIENT
Start: 2025-03-06 | End: 2025-03-05 | Stop reason: HOSPADM

## 2025-03-05 RX ORDER — ATORVASTATIN CALCIUM 20 MG/1
20 TABLET, FILM COATED ORAL DAILY
Status: DISCONTINUED | OUTPATIENT
Start: 2025-03-06 | End: 2025-03-05 | Stop reason: HOSPADM

## 2025-03-05 RX ORDER — ONDANSETRON 4 MG/1
4 TABLET, ORALLY DISINTEGRATING ORAL EVERY 8 HOURS PRN
Status: DISCONTINUED | OUTPATIENT
Start: 2025-03-05 | End: 2025-03-05 | Stop reason: HOSPADM

## 2025-03-05 RX ORDER — PREGABALIN 75 MG/1
75 CAPSULE ORAL ONCE
Status: COMPLETED | OUTPATIENT
Start: 2025-03-05 | End: 2025-03-05

## 2025-03-05 RX ORDER — MIDAZOLAM HYDROCHLORIDE 1 MG/ML
2 INJECTION, SOLUTION INTRAMUSCULAR; INTRAVENOUS ONCE
Status: COMPLETED | OUTPATIENT
Start: 2025-03-05 | End: 2025-03-05

## 2025-03-05 RX ADMIN — TRANEXAMIC ACID 1000 MG: 100 INJECTION INTRAVENOUS at 09:01

## 2025-03-05 RX ADMIN — KETOROLAC TROMETHAMINE 15 MG: 15 INJECTION, SOLUTION INTRAMUSCULAR; INTRAVENOUS at 13:23

## 2025-03-05 RX ADMIN — DEXAMETHASONE SODIUM PHOSPHATE 4 MG: 4 INJECTION, SOLUTION INTRAMUSCULAR; INTRAVENOUS at 09:08

## 2025-03-05 RX ADMIN — SODIUM CHLORIDE, SODIUM LACTATE, POTASSIUM CHLORIDE, AND CALCIUM CHLORIDE 100 ML/HR: 600; 310; 30; 20 INJECTION, SOLUTION INTRAVENOUS at 11:05

## 2025-03-05 RX ADMIN — SODIUM CHLORIDE, POTASSIUM CHLORIDE, SODIUM LACTATE AND CALCIUM CHLORIDE: 600; 310; 30; 20 INJECTION, SOLUTION INTRAVENOUS at 08:00

## 2025-03-05 RX ADMIN — PROPOFOL 75 MCG/KG/MIN: 10 INJECTION, EMULSION INTRAVENOUS at 08:59

## 2025-03-05 RX ADMIN — MELOXICAM 7.5 MG: 7.5 TABLET ORAL at 07:13

## 2025-03-05 RX ADMIN — MIDAZOLAM 1.5 MG: 1 INJECTION INTRAMUSCULAR; INTRAVENOUS at 08:47

## 2025-03-05 RX ADMIN — SCOPOLAMINE 1 PATCH: 1.5 PATCH, EXTENDED RELEASE TRANSDERMAL at 07:15

## 2025-03-05 RX ADMIN — TRANEXAMIC ACID 1000 MG: 100 INJECTION INTRAVENOUS at 10:22

## 2025-03-05 RX ADMIN — POVIDONE-IODINE 1 APPLICATION: 5 SOLUTION TOPICAL at 07:01

## 2025-03-05 RX ADMIN — PROPOFOL 40 MG: 10 INJECTION, EMULSION INTRAVENOUS at 08:58

## 2025-03-05 RX ADMIN — FENTANYL CITRATE 50 MCG: 0.05 INJECTION, SOLUTION INTRAMUSCULAR; INTRAVENOUS at 07:42

## 2025-03-05 RX ADMIN — MIDAZOLAM 0.5 MG: 1 INJECTION INTRAMUSCULAR; INTRAVENOUS at 09:05

## 2025-03-05 RX ADMIN — ACETAMINOPHEN 650 MG: 325 TABLET ORAL at 13:23

## 2025-03-05 RX ADMIN — ACETAMINOPHEN 975 MG: 325 TABLET ORAL at 07:12

## 2025-03-05 RX ADMIN — PREGABALIN 75 MG: 75 CAPSULE ORAL at 07:13

## 2025-03-05 RX ADMIN — MIDAZOLAM 2 MG: 1 INJECTION INTRAMUSCULAR; INTRAVENOUS at 07:42

## 2025-03-05 RX ADMIN — ONDANSETRON 4 MG: 2 INJECTION INTRAMUSCULAR; INTRAVENOUS at 10:25

## 2025-03-05 RX ADMIN — MEPIVACAINE HYDROCHLORIDE 3.6 ML: 15 INJECTION, SOLUTION EPIDURAL; INFILTRATION at 08:52

## 2025-03-05 RX ADMIN — CEFAZOLIN SODIUM 2 G: 2 INJECTION, SOLUTION INTRAVENOUS at 08:54

## 2025-03-05 RX ADMIN — OXYCODONE HYDROCHLORIDE 10 MG: 10 TABLET, FILM COATED, EXTENDED RELEASE ORAL at 07:14

## 2025-03-05 SDOH — HEALTH STABILITY: MENTAL HEALTH: CURRENT SMOKER: 0

## 2025-03-05 ASSESSMENT — PAIN SCALES - GENERAL
PAINLEVEL_OUTOF10: 0 - NO PAIN
PAINLEVEL_OUTOF10: 2
PAINLEVEL_OUTOF10: 0 - NO PAIN
PAINLEVEL_OUTOF10: 0 - NO PAIN
PAINLEVEL_OUTOF10: 1
PAINLEVEL_OUTOF10: 0 - NO PAIN
PAINLEVEL_OUTOF10: 1
PAINLEVEL_OUTOF10: 3
PAINLEVEL_OUTOF10: 1
PAINLEVEL_OUTOF10: 0 - NO PAIN

## 2025-03-05 ASSESSMENT — COGNITIVE AND FUNCTIONAL STATUS - GENERAL
CLIMB 3 TO 5 STEPS WITH RAILING: A LITTLE
MOBILITY SCORE: 20
MOVING TO AND FROM BED TO CHAIR: A LITTLE
STANDING UP FROM CHAIR USING ARMS: A LITTLE
WALKING IN HOSPITAL ROOM: A LITTLE

## 2025-03-05 ASSESSMENT — PAIN - FUNCTIONAL ASSESSMENT
PAIN_FUNCTIONAL_ASSESSMENT: 0-10
PAIN_FUNCTIONAL_ASSESSMENT: 0-10
PAIN_FUNCTIONAL_ASSESSMENT: WONG-BAKER FACES
PAIN_FUNCTIONAL_ASSESSMENT: 0-10
PAIN_FUNCTIONAL_ASSESSMENT: 0-10
PAIN_FUNCTIONAL_ASSESSMENT: WONG-BAKER FACES
PAIN_FUNCTIONAL_ASSESSMENT: 0-10
PAIN_FUNCTIONAL_ASSESSMENT: WONG-BAKER FACES
PAIN_FUNCTIONAL_ASSESSMENT: 0-10
PAIN_FUNCTIONAL_ASSESSMENT: WONG-BAKER FACES
PAIN_FUNCTIONAL_ASSESSMENT: 0-10

## 2025-03-05 ASSESSMENT — ACTIVITIES OF DAILY LIVING (ADL)
ADLS_ADDRESSED: YES
ADL_ASSISTANCE: INDEPENDENT

## 2025-03-05 ASSESSMENT — PAIN DESCRIPTION - DESCRIPTORS
DESCRIPTORS: SORE
DESCRIPTORS: TINGLING

## 2025-03-05 ASSESSMENT — COLUMBIA-SUICIDE SEVERITY RATING SCALE - C-SSRS
6. HAVE YOU EVER DONE ANYTHING, STARTED TO DO ANYTHING, OR PREPARED TO DO ANYTHING TO END YOUR LIFE?: NO
1. IN THE PAST MONTH, HAVE YOU WISHED YOU WERE DEAD OR WISHED YOU COULD GO TO SLEEP AND NOT WAKE UP?: NO
2. HAVE YOU ACTUALLY HAD ANY THOUGHTS OF KILLING YOURSELF?: NO

## 2025-03-05 NOTE — PROGRESS NOTES
Met with Patient and Care Partner at bedside- Patient is s/p Right Total Knee Replacement with Dr. Umesh Lim.  Discussion with patient included education on the following topics: [facility] Education: Wound Care (Bandage Care & Removal, Personal Hygiene & Infection Prevention), Post-Op Activity (Home PT Regimen, Movement Precautions, Assistive Equipment & 1-2hr Movement), Post-Op Precautions (Falls, Blood Clots & Constipation), Cold-Therapy (Ice vs. Cold Therapy Machines) , Methods for Symptom Management (Pain, Nausea, Swelling & Constipation), Importance of Post-op Prescriptions, How to Obtain Medication Refills, When to Resume Driving & Who to Call, Use of Tactilize & Staff Contact Information, When to call 9-1-1, and When to call the Surgeon's Office.  Patient Did Complete and Live class prior to surgery.  Patient is able to verbalize understanding of class content/post-operative discussion.  Contact information was provided to patient for support and assistance during the post-operative period.    At the time of this discussion, the patient's plan includes:    Discharge Date/Disposition:  Home, Today with, Home Care Services  Discharge Needs: No Equipment Needs Identified  Medications/Pharmacy: [id number] service utilized for discharge prescriptions through [facility]

## 2025-03-05 NOTE — OP NOTE
Knee Replacement Total Cement Unilat ** Overnight ** (R) Operative Note     Date: 3/5/2025  OR Location: [facility]    Name: Mary Kate Short, : 1960, Age: 64 y.o., MRN: [id number], Sex: female    Diagnosis  Pre-op Diagnosis      * Unilateral primary osteoarthritis, right knee [M17.11] Post-op Diagnosis     * Unilateral primary osteoarthritis, right knee [M17.11]     Procedures  Knee Replacement Total Cement Unilat ** Overnight **  58957 - PA ARTHRP KNE CONDYLE&PLATU MEDIAL&LAT COMPARTMENTS      Surgeons      * Umesh Lim - Primary    Resident/Fellow/Other Assistant:  Surgeons and Role:  * No surgeons found with a matching role *    Staff:   Marlinulator: Eliza  Scrub Person: Daniela  Scrub Person: Gayle    Anesthesia Staff: Anesthesiologist: Parviz Martinez MD  C-AA: DOMITILA Patrick    Procedure Summary  Anesthesia: General  ASA: II  Estimated Blood Loss: 25mL  Intra-op Medications:   Administrations occurring from 0805 to 1045 on 25:   Medication Name Total Dose   ropivacaine-epinephrine-clonidine-ketorolac 2.46-0.005- 0.0008-0.3mg/mL periarticular syringe 50 mL   dexAMETHasone (Decadron) injection 4 mg/mL 4 mg   mepivacaine (Carbocaine) injection 1.5 % 3.6 mL   midazolam (Versed) injection 1 mg/mL 2 mg   propofol (Diprivan) injection 10 mg/mL 587.12 mg   tranexamic acid (Cyklokapron) injection 1,000 mg   ceFAZolin (Ancef) 2 g in dextrose (iso)  mL 2 g              Anesthesia Record               Intraprocedure I/O Totals          Intake    Tranexamic Acid 0.00 mL    The total shown is the total volume documented since Anesthesia Start was filed.    Total Intake 0 mL          Specimen: No specimens collected              Drains and/or Catheters:   Closed/Suction Drain Right Knee (Active)       Tourniquet Times:   * Missing tourniquet times found for documented tourniquets in lo *     Implants:  Implants       Type Name Action Serial No.      Joint Knee BONE CEMENT, SMART  SET, HIGH VISCOSITY, 40GM - [id number] Implanted      Joint Knee BONE CEMENT, SMART SET, HIGH VISCOSITY, 40GM - [id number] Implanted       SIZE 5 ATTUNE FIXED BEARING TIBIAL BASE, CEMENTED DUPLICATE ENTRY, PLEASE TRANSITION TO CATALOG # [id number] Implanted      Joint Knee FEMORAL, ATTUNE PS, ALEXUS, NRW, SZ 5, RT - [id number] Implanted      Joint Knee DOME, PATELLA, MEDIALIZED, 35MM - [id number] Implanted      Joint Knee INSERT, ATTUNE PS FB, SZ 5, 8MM - [id number] Implanted               Findings: severe OA    Indications: Mary Kate Short is an 64 y.o. female who is having surgery for Unilateral primary osteoarthritis, right knee [M17.11].     The patient was seen in the preoperative area. The risks, benefits, complications, treatment options, non-operative alternatives, expected recovery and outcomes were discussed with the patient. The possibilities of reaction to medication, pulmonary aspiration, injury to surrounding structures, bleeding, recurrent infection, the need for additional procedures, failure to diagnose a condition, and creating a complication requiring transfusion or operation were discussed with the patient. The patient concurred with the proposed plan, giving informed consent.  The site of surgery was properly noted/marked if necessary per policy. The patient has been actively warmed in preoperative area. Preoperative antibiotics have been ordered and given within 1 hours of incision. Venous thrombosis prophylaxis have been ordered including bilateral sequential compression devices    Procedure Details: R TKA  Complications:  None; patient tolerated the procedure well.    Disposition: PACU - hemodynamically stable.  Condition: stable     PREOPERATIVE DIAGNOSIS:  right knee osteoarthritis     POSTOPERATIVE DIAGNOSIS:  right knee osteoarthritis     OPERATION/PROCEDURE:  right total knee arthroplasty     SURGEON:  Umesh Lim MD     ASSISTANT(S):  Timothy Rosado MD PGY4      ANESTHESIA:  Spinal, adductor canal block     LOCATION:  [facility]     ESTIMATED BLOOD LOSS AND INTRAVENOUS FLUIDS:  Please see Anesthesia record     COMPONENTS USED:  DePuy Attune knee system  1. 5N femur  2. 5 tibia  3. 35 patella  4. 8mm insert     BRIEF CLINICAL NOTE:  The patient is a 63 yo female with advanced osteoarthritis of  their right knee.  They failed conservative treatment and wished to  proceed with total knee arthroplasty which is indicated at this time.  We discussed the risks, benefits, and alternatives of surgery  including but not limited to infection, damage to vessel or nerve,  bleeding, soft tissue pain, DVT, PE, problems with anesthesia, lack  of range of motion, continued soft tissue pain, need for further  surgery, etc.  Consent was obtained.  They were taken to the operating  room in order to undergo the procedure.    PRE-OP ROM: 5-115, valgus     OPERATIVE REPORT:  The patient was transferred to the operating room table.  Time-out  was performed confirming patient name, medical record number,  surgical site, and adequate and appropriate imaging.  The patient  received appropriate IV antibiotics as well as tranexamic acid.  Once we were prepped and draped,midline skin incision was performed.  Hemostasis was obtained using electrocautery.  Underlying extensor mechanism was easily identified and entered using a standard medial parapatellar arthrotomy performedsharply.  The infrapatellar and suprapatellar fat pads were debrided.Initial medial release was performed.  The patella was subluxed laterally.  Distal femur was entered using a step drill.  Distal  femoral cut was performed, and the femur was sized and prepared.  The tibia was subluxed forward using a double-prong PCL retractor.  The proximal tibia was cut with valgus bias..  We then used the lamina  to clear out the posterior osteophytes and clear the meniscal remnants. We then sized the tibia and prepared it. We cut the patella  freehand using a caliper to restore the native patellar height. We then trialed with multiple polyethylene inserts.  Once I was happy with the position of components and stability of the knee, all trial components were removed.  The  cut bony surfaces were thoroughly irrigated and dried.  The posterior capsule and surrounding soft tissues were injected with LOLA solution we then cemented into place the real components.  The knee was held in extension until all cement was hardened.  All extraneous cement was  removed.  We then closed the extensor mechanism over a drain using interrupted #2 Ethibond as well as interrupted 0 Vicryl.  The subcu was closed with interrupted 2-0 Vicryl.  The skin was closed with  running 3-0 Biosyn followed by Dermabond and Steri-Strips.  Dry sterile dressing was placed.  The patient was transferred back to the hospital bed without incident or complication.  She will be  weightbearing as tolerated.  They will be on ASA and SCDs for DVT prophylaxis.     Additional Details: WBAT, ASA    Attending Attestation: I was present and scrubbed for the key portions of the procedure.

## 2025-03-05 NOTE — ANESTHESIA PROCEDURE NOTES
Peripheral Block    Patient location during procedure: pre-op  Start time: 3/5/2025 7:45 AM  End time: 3/5/2025 7:47 AM  Reason for block: at surgeon's request and post-op pain management  Staffing  Performed: attending   Authorized by: Robby Butler DO    Performed by: Robby Butler DO  Preanesthetic Checklist  Completed: patient identified, IV checked, site marked, risks and benefits discussed, surgical consent, monitors and equipment checked, pre-op evaluation and timeout performed   Timeout performed at: 3/5/2025 7:40 AM  Peripheral Block  Patient position: laying flat  Prep: ChloraPrep  Patient monitoring: heart rate, cardiac monitor and continuous pulse ox  Block type: adductor canal  Laterality: right  Injection technique: single-shot  Guidance: ultrasound guided  Local infiltration: ropivacaine  Infiltration strength: 0.5 %  Dose: 30 mL  Needle  Needle gauge: 22 G  Needle length: 10 cm  Needle localization: ultrasound guidance     image stored in chart  Assessment  Injection assessment: negative aspiration for heme, no paresthesia on injection and incremental injection  Paresthesia pain: none  Heart rate change: no  Slow fractionated injection: yes  Additional Notes  With 4mg decadron

## 2025-03-05 NOTE — DISCHARGE SUMMARY
MD Jeannie Martinez, MPAS, PACarlitoC, ATC  Adult Reconstruction and Joint Replacement Surgery  Phone: [phone]     Fax:[phone]             Discharge Summary    Discharge Diagnosis  Right Total Knee Arthroplasty    Issues Requiring Follow-Up  Home care services to start within 48 hours. Outpatient PT to start 2 weeks  S/P total Joint for Knees only. Hips optional.    Test Results Pending At Discharge  Pending Labs       No current pending labs.          Hospital Course  Patient underwent Right Total Knee Arthroplasty on 3/5 without complications. The patient was then taken to the PACU in stable condition. Patient was then transferred to the or.  Pain was appropriately controlled. Diet was advanced as tolerated. Patient progressed adequately through their recovery during hospital stay including PT/ OT and were recommended for discharge. Patient was then discharged on  to home in stable condition. Patient had uneventful hospital course. Patient was instructed on the use of pain medications as needed for pain. The signs and symptoms of infection were discussed and the patient was given our number to call should they have any questions or concerns following discharge.    Based on my clinical judgment, the patient was provided with a 7-day prescription for opioid medication at 30 MED, indicated for treatment of acute pain in the setting of recent Total Joint Arthroplasty. OARRS report was run and has demonstrated an appropriate time course.  The patient has been provided with counseling pertaining to safe use of opioid medication.    Patient may use operative extremity WBAT with use of walker for assistance with ambulation .  Mepilex dressing to be removed POD # 7 by home care and incision left open to air  OAC for DVT prophylaxis started on POD #1 and to be taken for 30 days    Patient is to follow-up in 6 weeks at scheduled post-op visit.     Face-to Face after surgery progress  note  Pertinent Physical Exam At Time of Discharge  Review of Systems   Constitutional: Negative.  Negative for activity change, chills, fatigue and fever.   HENT: Negative.     Eyes: Negative.    Respiratory: Negative.  Negative for cough, chest tightness, shortness of breath and wheezing.    Cardiovascular: Negative.  Negative for palpitations.   Gastrointestinal:  Negative for abdominal pain, blood in stool, nausea and vomiting.   Endocrine: Negative.  Negative for cold intolerance and polyuria.   Genitourinary: Negative.  Negative for difficulty urinating, dysuria, frequency, hematuria and urgency.   Musculoskeletal:  Positive for gait problem and joint swelling. Negative for arthralgias and back pain.   Skin: Negative.  Negative for color change, pallor, rash and wound.   Allergic/Immunologic: Negative.  Negative for environmental allergies.   Neurological:  Negative for dizziness, weakness and light-headedness.   Hematological: Negative.    Psychiatric/Behavioral:  Negative for agitation, confusion and suicidal ideas. The patient is not nervous/anxious.    All other systems reviewed and are negative    Physical Exam  side: right knee  Vitals and nursing note reviewed. VSS, Afebrile  Constitutional:       Appearance: Normal appearance, awake and alert.  HENT:      Head: Normocephalic and atraumatic.       Pupils: Pupils are equal, round, and reactive to light.   Cardiovascular:      Rate and Rhythm: Normal rate and regular rhythm.   Pulmonary:      Effort: Pulmonary effort is normal.     Abdominal:         Palpations: Abdomen is soft.   Musculoskeletal:   Sensation intact bilaterally, sural/saph/sp/tibal n.  Motor intact flexion/extension/DF/PF/EHL/FHL bilaterally. Palpable symmetric DP/PT pulse bilaterally. Spinal wearing off.    Skin:      Bulky Dressing intact to the surgical extremity. No signs of gross bloody or purulent drainage.     General: Skin is warm and dry.      Capillary Refill: Capillary refill  takes less than 2 seconds.   Neurological:      General: No focal deficit present.      Mental Status: She is alert and oriented to person, place, and time. Mental status is at baseline.   Psychiatric:         Mood and Affect: Mood normal.        Home Medications  Scheduled medications    Current Facility-Administered Medications:     albuterol 2.5 mg /3 mL (0.083 %) nebulizer solution 2.5 mg, 2.5 mg, nebulization, Once PRN, Parviz Martinez MD    hydrALAZINE (Apresoline) injection 5 mg, 5 mg, intravenous, q30 min PRN, Parviz Martinez MD    HYDROmorphone (Dilaudid) injection 0.5 mg, 0.5 mg, intravenous, q5 min PRN, Parviz Martinez MD    HYDROmorphone PF (Dilaudid) injection 0.1 mg, 0.1 mg, intravenous, q5 min PRN, Parviz Martinez MD    HYDROmorphone PF (Dilaudid) injection 0.2 mg, 0.2 mg, intravenous, q5 min PRN, Parviz Martinez MD    lactated Ringer's infusion, 100 mL/hr, intravenous, Continuous, Parviz Martinez MD, Last Rate: 100 mL/hr at 03/05/25 1105, 100 mL/hr at 03/05/25 1105    lidocaine PF (Xylocaine) 10 mg/mL (1 %) injection 1 mg, 0.1 mL, subcutaneous, Once, Parviz Martinez MD    ondansetron (Zofran) injection 4 mg, 4 mg, intravenous, Once PRN, Parviz Martinez MD    oxygen (O2) therapy, , inhalation, Continuous PRN - O2/gases, Parviz Martinez MD    scopolamine (Transderm-Scop) patch 1 patch, 1 patch, transdermal, q72h, Jeannie Reich PA-C, 1 patch at 03/05/25 0715     PRN medications  PRN medications: albuterol, hydrALAZINE, HYDROmorphone, HYDROmorphone, HYDROmorphone, ondansetron, oxygen    Discharge medications     Your medication list        START taking these medications        Instructions Last Dose Given Next Dose Due   acetaminophen 500 mg tablet  Commonly known as: Tylenol Extra Strength      Take 2 tablets (1,000 mg) by mouth every 6 hours if needed for mild pain (1 - 3).       aspirin 81 mg EC tablet      Take 1 tablet (81 mg) by mouth 2 times a day.       docusate sodium 100 mg  capsule  Commonly known as: Colace      Take 1 capsule (100 mg) by mouth 2 times a day.       meloxicam 15 mg tablet  Commonly known as: Mobic      Take 1 tablet (15 mg) by mouth once daily.       oxyCODONE 5 mg immediate release tablet  Commonly known as: Roxicodone      Take 1 tablet (5 mg) by mouth every 6 hours if needed for severe pain (7 - 10) for up to 7 days.       pantoprazole 40 mg EC tablet  Commonly known as: ProtoNix      Take 1 tablet (40 mg) by mouth once daily. Do not crush, chew, or split.       polyethylene glycol 17 gram/dose powder  Commonly known as: Glycolax, Miralax      Mix 17 g of powder and drink once daily. Mix 1 cap (17g) into 8 ounces of fluid.       traMADol 50 mg tablet  Commonly known as: Ultram      Take 1 tablet (50 mg) by mouth every 6 hours if needed for severe pain (7 - 10) for up to 7 days.              CONTINUE taking these medications        Instructions Last Dose Given Next Dose Due   atorvastatin 20 mg tablet  Commonly known as: Lipitor      Take 1 tablet (20 mg) by mouth once daily.       citalopram 20 mg tablet  Commonly known as: CeleXA      TAKE 2 TABLETS BY MOUTH DAILY X 60 TABS       famotidine 20 mg tablet  Commonly known as: Pepcid      Take 1 tablet (20 mg) by mouth once daily for 14 days.       hydroCHLOROthiazide 12.5 mg capsule  Commonly known as: Microzide      Take 1 capsule (12.5 mg) by mouth once daily.       levothyroxine 112 mcg tablet  Commonly known as: Synthroid, Levoxyl      Take 1 tablet (112 mcg) by mouth once daily.       LORazepam 0.5 mg tablet  Commonly known as: Ativan      Take 1 tablet (0.5 mg) by mouth 2 times a day as needed for anxiety.       triamcinolone 0.1 % cream  Commonly known as: Kenalog      Apply topically 2 times a day. To areas of eczema and itchy spots on body for up to 2 weeks                 Where to Get Your Medications        These medications were sent to [facility]  [address], [address], [facility]  [id number]      Hours: 8 AM to 6 PM Mon-Fri, 9 AM to 1 PM Saturday Phone: [phone]   acetaminophen 500 mg tablet  aspirin 81 mg EC tablet  docusate sodium 100 mg capsule  meloxicam 15 mg tablet  oxyCODONE 5 mg immediate release tablet  pantoprazole 40 mg EC tablet  polyethylene glycol 17 gram/dose powder  traMADol 50 mg tablet         You have not been prescribed any medications.     Outpatient Follow-Up  Patient to follow-up with /Jeannie Reich PA-C.  Thank you for trusting us with your care. You should be scheduled for a follow-up post-surgical visit in 6 weeks.    Special Instructions  none    Please read discharge instructions provided by your surgeon before calling with questions as this will delay care.    Medication refills-Oxycodone and Tramadol will be refilled every 7 days per state law. Request refills through [facility] at the institution in which you had surgery or MyChart. All medication requests may take up to 72 hours to refill and refills after Friday 1pm will be refilled on the next business day.      No future appointments.    Timothy Cortez MD  Orthopedic surgery PGY-4

## 2025-03-05 NOTE — PERIOPERATIVE NURSING NOTE
1101 Arrives to pacu  Awakens to verbal Denies pain and nausea. Lungs clear all sinus rhythm IV patent. Spinal T 10 Unable to move legs.  Abd soft. Dressing dry and intact. Pulses present Capillary refill brisk. Jovial Polo hugger applied.  1111 Taking deep breaths and coughing.   1122 L3/L4  able to roll legs. No pain no nausea.  1150 Spinal progressing nicely moving fee L5 / No pain or nausea. S1 SDS updated on patient progress.

## 2025-03-05 NOTE — ANESTHESIA PROCEDURE NOTES
Spinal Block    Patient location during procedure: OR  Start time: 3/5/2025 8:48 AM  End time: 3/5/2025 8:52 AM  Reason for block: primary anesthetic  Staffing  Performed: DOMITILA   Authorized by: Parviz Martinez MD    Performed by: DOMITILA Patrick    Preanesthetic Checklist  Completed: patient identified, IV checked, risks and benefits discussed, surgical consent, monitors and equipment checked, pre-op evaluation, timeout performed and sterile techniques followed  Block Timeout  RN/Licensed healthcare professional reads aloud to the Anesthesia provider and entire team: Patient identity, procedure with side and site, patient position, and as applicable the availability of implants/special equipment/special requirements.    Timeout performed at: 3/5/2025 8:48 AM  Spinal Block  Patient position: sitting  Prep: Betadine  Sterility prep: cap, drape, gloves, hand hygiene and mask  Sedation level: light sedation  Patient monitoring: blood pressure, continuous pulse oximetry and heart rate  Approach: midline  Vertebral space: L3-4  Injection technique: single-shot  Needle  Needle type: pencil-point   Needle gauge: 24 G  Needle length: 4 in  Free flowing CSF: yes    Assessment  Sensory level: T6 bilateral  Procedure assessment: patient sedated but conversant throughout procedure and patient tolerated procedure well with no immediate complications  Additional Notes  3.6ML 1.5% MEPIVACAINE PF INJECTED  SPINAL KIT EXP: 06-  LOT # [id number]

## 2025-03-05 NOTE — PROGRESS NOTES
Medication Education     Medication education for Mary Kate Short was provided to the patient and family for the following medication(s):    APAP  ASA  Docusate  Meloxicam  Oxycodone  Pantoprazole  Polyethylene glycol  Tramadol    Medication education provided by a Pharmacist:  Dose, frequency, storage How to take and what to do if a dose is missed Proper dose, indication, possible ADRs How the medication works and benefits of taking it    Identified potential barriers to education:  None    Method(s) of Education:  Verbal Written materials provided and reviewed    An opportunity to ask questions and receive answers was provided.     Assessment of understanding the patient and family:  2= meets goals/outcomes    Additional Notes (if applicable): meds 2 beds delivered to patient    Salo McneilD

## 2025-03-05 NOTE — HH CARE COORDINATION
Home Care received a Referral for Physical Therapy. We have processed the referral for a Start of Care on 03-06-25.     If you have any questions or concerns, please feel free to contact us at [phone]. Follow the prompts, enter your five digit zip code, and you will be directed to your care team on [facility].

## 2025-03-05 NOTE — ANESTHESIA PREPROCEDURE EVALUATION
Patient: Mary Kate Short    Procedure Information       Date/Time: 03/05/25 0805    Procedure: Knee Replacement Total Cement Unilat ** Overnight ** (Right: Knee) - DePuy Attune Knee, Pineapple Silvina    Location: [facility] / [facility]    Surgeons: Umesh Lim MD          Past Medical History:   Diagnosis Date    Cat bite 09/27/2024        Relevant Problems   Cardiac   (+) Benign essential hypertension   (+) Hyperlipidemia      Neuro   (+) Depression   (+) Lumbar radiculopathy      /Renal   (+) Kidney stones      Endocrine   (+) Hypothyroidism      Musculoskeletal   (+) Primary localized osteoarthrosis of left lower leg   (+) Primary osteoarthritis of right knee   (+) Unilateral primary osteoarthritis, right knee     Past Surgical History:   Procedure Laterality Date    OTHER SURGICAL HISTORY  10/14/2020    Oophorectomy bilateral    OTHER SURGICAL HISTORY  10/14/2020    Hysterectomy    OTHER SURGICAL HISTORY  10/14/2020    Appendectomy      Clinical information reviewed:   Tobacco  Allergies  Meds   Med Hx  Surg Hx   Fam Hx          NPO Detail:  No data recorded     Physical Exam    Airway  Mallampati: II  TM distance: >3 FB  Neck ROM: full     Cardiovascular    Dental    Pulmonary    Abdominal            Anesthesia Plan    History of general anesthesia?: yes  History of complications of general anesthesia?: no    ASA 2     general   (Adductor canal block)  The patient is not a current smoker.  Patient was not previously instructed to abstain from smoking on day of procedure.  Patient did not smoke on day of procedure.    intravenous induction   Postoperative administration of opioids is intended.  Anesthetic plan and risks discussed with patient.    Plan discussed with attending.

## 2025-03-05 NOTE — PROGRESS NOTES
Physical Therapy Evaluation & Treatment    Patient Name: Mary Kate Short  MRN: [id number]  Department:   Room: [facility] Date: 3/5/2025   Time Calculation  Start Time: 1324  Stop Time: 1430  Time Calculation (min): 66 min    Assessment/Plan   PT Assessment  PT Assessment Results: Decreased strength, Decreased range of motion, Decreased endurance, Impaired balance, Decreased mobility, Pain, Orthopedic restrictions  Rehab Prognosis: Good  Barriers to Discharge Home: No anticipated barriers  Evaluation/Treatment Tolerance: Patient tolerated treatment well  End of Session Communication: Bedside nurse  Assessment Comment: Pt presents with impaired functional mobility s/p R TKR. Recommend discharge home with 24 hour supervision/intermittent assistance and home health PT. Pt was issued HEP handout. Pt verbalized and demonstrated understanding.   End of Session Patient Position: Up in chair, BLE elevated, ice to surgical site, call light in reach, needs met, RN aware.  IP OR SWING BED PT PLAN  Inpatient or Swing Bed: Inpatient  PT Plan  Treatment/Interventions: Bed mobility, Transfer training, Gait training, Stair training, Balance training, Strengthening, Endurance training, Range of motion, Therapeutic exercise, Therapeutic activity, Home exercise program, Positioning  PT Plan: Ongoing PT  PT Frequency: BID  PT Discharge Recommendations: Low intensity level of continued care  Equipment Recommended upon Discharge: Wheeled walker  PT Recommended Transfer Status: Stand by assist, Assistive device (RLE knee immobilizer)  PT - OK to Discharge: Yes      Subjective     General Visit Information:  General  Reason for Referral: R TKR  Referred By: Dr. Lim  Past Medical History Relevant to Rehab: OA, HTN, JOSUÉ, hypothyroidism, hysterectomy, appendectomy  Family/Caregiver Present: Yes (spouse)  Prior to Session Communication: Bedside nurse  Patient Position Received: Bed, 2 rail up  General Comment: . R knee  post-op dressing dry but with loose edge. PT removed silver mepilex and replaced with new silver mepilex - use of sterile technique - pt and PT wore mask. hemovac to R knee removed by PT, pressure held, clean ABDs and ace wrap applied, long leg ace wrap reapplied, RN aware.    Home Living:  Home Living  Type of Home: House  Lives With: Spouse  Home Adaptive Equipment: Walker rolling or standard, Reacher (walking stick)  Home Layout: Multi-level, Stairs to alternate level with rails, Bed/bath upstairs  Alternate Level Stairs-Rails: Right  Alternate Level Stairs-Number of Steps: 12  Home Access: Stairs to enter with rails  Entrance Stairs-Rails: Right  Entrance Stairs-Number of Steps: 2  Prior Level of Function:  Prior Function Per Pt/Caregiver Report  Level of [place]: Independent with ADLs and functional transfers, Independent with homemaking with ambulation  ADL Assistance: Independent  Homemaking Assistance: Independent  Ambulatory Assistance: Independent  Vocational: Retired (retired  lily)  Prior Function Comments: Pt denies falls prior to admission.  Precautions:  Precautions  LE Weight Bearing Status: Weight Bearing as Tolerated  Medical Precautions: Fall precautions  Post-Surgical Precautions: Right total knee precautions       Objective   Pain:  Pain Assessment  Pain Assessment: 0-10  0-10 (Numeric) Pain Score: 3  Pain Type: Surgical pain  Pain Location: Knee  Pain Orientation: Right  Pain Interventions: Cold applied, Repositioned, Ambulation/increased activity  Cognition:  Cognition  Overall Cognitive Status: Within Functional Limits  Attention: Within Functional Limits  Memory: Within Funtional Limits  Problem Solving: Within Functional Limits  Numeric Reasoning: Within Functional Limits  Abstract Reasoning: Within Functional Limits  Safety/Judgement: Within Functional Limits  Insight: Within function limits    General Assessments:  Activity Tolerance  Endurance: Endurance does not limit  participation in activity    Sensation  Light Touch: No apparent deficits    Coordination  Movements are Fluid and Coordinated: Yes    Postural Control  Postural Control: Within Functional Limits    Static Sitting Balance  Static Sitting-Balance Support: Feet supported  Static Sitting-Level of Assistance: Independent  Dynamic Sitting Balance  Dynamic Sitting-Balance Support: Feet supported  Dynamic Sitting-Level of Assistance: Independent    Static Standing Balance  Static Standing-Balance Support: Bilateral upper extremity supported (with RW)  Static Standing-Level of Assistance: Contact guard, Close supervision (CGA progressing to CS with knee immobilizer on)  Dynamic Standing Balance  Dynamic Standing-Balance Support: Bilateral upper extremity supported (with RW)  Dynamic Standing-Level of Assistance: Contact guard, Close supervision (CGA progressing to CS with knee immobilizer on)  Functional Assessments:  ADL  ADL's Addressed: Yes  UE Dressing Assistance: Independent  LE Dressing Assistance: Stand by  LE Dressing Deficit: Verbal cueing, Supervision/safety  Toileting Assistance with Device: Stand by  Toileting Deficit: Supervison/safety, Verbal cueing, Grab bar use  Functional Assistance: Stand by  Functional Deficit: Verbal cueing, Supervision/safety, Toilet transfer    Bed Mobility  Bed Mobility: Yes  Bed Mobility 1  Bed Mobility 1: Supine to sitting  Level of Assistance 1: Distant supervision    Transfers  Transfer: Yes  Transfer 1  Transfer From 1: Bed to, Toilet to, Wheelchair to, Chair with arms to  Transfer to 1: Toilet, Wheelchair, Chair with arms  Technique 1: Sit to stand, Stand to sit  Transfer Device 1: Walker  Transfer Level of Assistance 1: Close supervision, Minimal verbal cues (cues for hand placement)    Ambulation/Gait Training  Ambulation/Gait Training Performed: Yes  Ambulation/Gait Training 1  Surface 1: Level tile  Device 1: Rolling walker  Assistance 1: Contact guard, Minimal verbal cues  (cues for sequencing and R quad contraction to prevent buckling)  Quality of Gait 1: Diminished heel strike, Decreased step length, Antalgic, Soft knee(s) (decreased carlos, step to pattern, intermittent R knee buckling, improved heel strike with cues.)  Comments/Distance (ft) 1: 100 feet x 1    Ambulation/Gait Training 2  Surface 2: Level tile  Device 2: Rolling walker  Gait Support Devices: Knee immobilizer (RLE)  Assistance 2: Close supervision  Quality of Gait 2: Decreased step length (decreased carlos, step to pattern progressing to reciprocal pattern, no LOB noted.)  Comments/Distance (ft) 2: 100 feet x 1    Stairs  Stairs: Yes  Stairs  Rails 1: Bilateral  Device 1: Railing  Assistance 1: Contact guard, Minimal verbal cues (cues for sequencing)  Comment/Number of Steps 1: 3  Pt demonstrated step to pattern, decreased carlos, no LOB noted.     Stairs 2  Rails 2: Right (BUE support on one rail)  Device 2: Railing  Support Devices 2:  (RLE knee immobilizer)  Assistance 2: Close supervision, Minimal verbal cues (cues for sequencing)  Comment/Number of Steps 2: 3  Pt demonstrated step to pattern, decreased carlos, no LOB noted.     Extremity/Trunk Assessments:  RUE   RUE : Within Functional Limits  LUE   LUE: Within Functional Limits  RLE   RLE : Exceptions to WFL, knee ROM/strength limited due to post-op pain and surgeon restrictions.   LLE   LLE : Within Functional Limits  Treatments:  Therapeutic Exercise  Therapeutic Exercise Performed: Yes  B ankle pumps, R quad sets, R gluteal sets, R heel slides, R SAQ, R hip abduction, and R SLR x 10 reps each.  Outcome Measures:  [facility] Basic Mobility  Turning from your back to your side while in a flat bed without using bedrails: None  Moving from lying on your back to sitting on the side of a flat bed without using bedrails: None  Moving to and from bed to chair (including a wheelchair): A little  Standing up from a chair using your arms (e.g. wheelchair or bedside  chair): A little  To walk in hospital room: A little  Climbing 3-5 steps with railing: A little  Basic Mobility - Total Score: 20        Education Documentation  Handouts, taught by Jayne Byrd PT at 3/5/2025  1:24 PM.  Learner: Significant Other, Patient  Readiness: Acceptance  Method: Explanation, Demonstration, Handout  Response: Verbalizes Understanding, Demonstrated Understanding    Precautions, taught by Jayne Byrd PT at 3/5/2025  1:24 PM.  Learner: Significant Other, Patient  Readiness: Acceptance  Method: Explanation, Demonstration, Handout  Response: Verbalizes Understanding, Demonstrated Understanding    Body Mechanics, taught by Jayne Byrd PT at 3/5/2025  1:24 PM.  Learner: Significant Other, Patient  Readiness: Acceptance  Method: Explanation, Demonstration, Handout  Response: Verbalizes Understanding, Demonstrated Understanding    Home Exercise Program, taught by Jayne Byrd PT at 3/5/2025  1:24 PM.  Learner: Significant Other, Patient  Readiness: Acceptance  Method: Explanation, Demonstration, Handout  Response: Verbalizes Understanding, Demonstrated Understanding    Mobility Training, taught by Jayne Byrd PT at 3/5/2025  1:24 PM.  Learner: Significant Other, Patient  Readiness: Acceptance  Method: Explanation, Demonstration, Handout  Response: Verbalizes Understanding, Demonstrated Understanding    Education Comments  No comments found.    Jayne Byrd, EVELINA, DPT

## 2025-03-05 NOTE — DISCHARGE INSTR - ACTIVITY
Patient Education: Knee Immobilizer   The purpose of wearing the knee immobilizer is to stabilize the knee joint to prevent buckling in the presence of quad (thigh muscle) weakness.  The brace will help prevent falls and support the knee during ambulation and stairs.  Please read and follow the instructions carefully to maintain your safety.    Be sure to walk every 1-2 hours while awake at home.     You may walk without the brace no earlier than 3/6/2025 after home physical therapy evaluates you.     IF STILL EXPERIENCING muscle weakness/buckling after the instructed use time, call your surgeon. Keep using the brace until you meet with physical therapist or surgeon.       Why is an immobilizer needed?  Anesthesia (spinal or nerve block) may cause quad weakness that can last for several hours causing knee buckling or knee hyperextension when bearing weight on the leg.  Some patients metabolize medication more slowly than others which may lead to longer lasting anesthesia and prolonged quad weakness.   When should I wear the immobilizer?  Any time you are up walking/completing stairs/standing up/ weight bearing  Wear the immobilizer for the period of time listed above   Remove the brace during periods of rest and personal hygiene  How do I put on the immobilizer?   The larger end of the brace should be at the top of the leg with the smaller end at the ankle  Position the brace with the black fabric metal liners along the inner and outer sides of the knee, the hard lining is also touching back of knee   The opening on the front of the immobilizer should surround the knee joint   Securely Velcro all straps, ensuring they are tight enough to keep the brace in place on the leg  Ensure the immobilizer is not causing numbness/tingling/limited blood flow to the foot/ lower leg  Safety Considerations  You must continue to use your prescribed walker, in addition to the immobilizer, when up and moving  Make sure the  immobilizer is applied correctly before getting up to move  Make sure you stand up/ sit down with walker in front of you while wearing the brace

## 2025-03-05 NOTE — ANESTHESIA POSTPROCEDURE EVALUATION
Patient: Mary Kate Short    Procedure Summary       Date: 03/05/25 Room / Location: [facility] / Virtual [facility]    Anesthesia Start: 0847 Anesthesia Stop: 1059    Procedure: Knee Replacement Total Cement Unilat ** Overnight ** (Right: Knee) Diagnosis:       Unilateral primary osteoarthritis, right knee      (Unilateral primary osteoarthritis, right knee [M17.11])    Surgeons: Umesh Lim MD Responsible Provider: Parviz Martinez MD    Anesthesia Type: general ASA Status: 2            Anesthesia Type: general    Vitals Value Taken Time   /67 03/05/25 1110   Temp 36 03/05/25 1111   Pulse 80 03/05/25 1111   Resp 11 03/05/25 1111   SpO2 92 % 03/05/25 1111   Vitals shown include unfiled device data.    Anesthesia Post Evaluation    Patient location during evaluation: PACU  Patient participation: complete - patient participated  Level of consciousness: awake and alert  Pain management: adequate  Airway patency: patent  Cardiovascular status: acceptable  Respiratory status: acceptable  Hydration status: acceptable  Postoperative Nausea and Vomiting: none        There were no known notable events for this encounter.

## 2025-03-05 NOTE — PERIOPERATIVE NURSING NOTE
"Pt eating lunch apparently without nausea.  States, \"I am doing well!\".  Female visitor at bedside.  Pt voices no needs at this time.    "

## 2025-03-05 NOTE — PROGRESS NOTES
03/05/25 1251   Discharge Planning   Expected Discharge Disposition Home H     Post op day 0 for right knee  INTERNAL referral order received, [facility] updated, awaiting confirmed SOC    UPDATE 1350:  Per chart review, ADOD is today.   [facility] updated, SOC confirmed for 3/6/2025

## 2025-03-05 NOTE — NURSING NOTE
Patient to room 21 for RR. Team notified of patient arrival.      Bedside handoff report done in PACU. Patient transported via cart to room.      Plan of day discussed with patient and family; all questions answered at this time. Patient and family verbalized understanding that patient is not to ambulate without RN at bedside. Bed locked and lowered, call light in reach. Patient safety maintained.     Orders reviewed and released.

## 2025-03-06 ENCOUNTER — HOME CARE VISIT (OUTPATIENT)
Dept: HOME HEALTH SERVICES | Facility: HOME HEALTH | Age: 65
End: 2025-03-06
Payer: MEDICARE

## 2025-03-06 VITALS
TEMPERATURE: 98.6 F | HEART RATE: 78 BPM | RESPIRATION RATE: 14 BRPM | OXYGEN SATURATION: 98 % | DIASTOLIC BLOOD PRESSURE: 60 MMHG | BODY MASS INDEX: 28.17 KG/M2 | SYSTOLIC BLOOD PRESSURE: 110 MMHG | HEIGHT: 64 IN | WEIGHT: 165 LBS

## 2025-03-06 PROCEDURE — 169592 NO-PAY CLAIM PROCEDURE

## 2025-03-06 PROCEDURE — G0151 HHCP-SERV OF PT,EA 15 MIN: HCPCS | Mod: HHH

## 2025-03-06 ASSESSMENT — PAIN SCALES - PAIN ASSESSMENT IN ADVANCED DEMENTIA (PAINAD)
NEGVOCALIZATION: 1
CONSOLABILITY: 1 - DISTRACTED OR REASSURED BY VOICE OR TOUCH.
FACIALEXPRESSION: 2 - FACIAL GRIMACING.
BODYLANGUAGE: 1 - TENSE. DISTRESSED PACING. FIDGETING.
CONSOLABILITY: 1
BREATHING: 0
BODYLANGUAGE: 1
NEGVOCALIZATION: 1 - OCCASIONAL MOAN OR GROAN. LOW-LEVEL SPEECH WITH A NEGATIVE OR DISAPPROVING QUALITY.
FACIALEXPRESSION: 2
TOTALSCORE: 5

## 2025-03-06 ASSESSMENT — ENCOUNTER SYMPTOMS
PAIN LOCATION - PAIN FREQUENCY: FREQUENT
SUBJECTIVE PAIN PROGRESSION: GRADUALLY IMPROVING
PAIN SEVERITY GOAL: 0/10
PAIN LOCATION - RELIEVING FACTORS: MEDS,REST
PAIN: 1
LOWEST PAIN SEVERITY IN PAST 24 HOURS: 3/10
PERSON REPORTING PAIN: PATIENT
HIGHEST PAIN SEVERITY IN PAST 24 HOURS: 7/10
PAIN LOCATION - PAIN SEVERITY: 5/10
PAIN LOCATION - PAIN QUALITY: ACHE
PAIN LOCATION: RIGHT KNEE

## 2025-03-07 ENCOUNTER — APPOINTMENT (OUTPATIENT)
Dept: HOME HEALTH SERVICES | Facility: HOME HEALTH | Age: 65
End: 2025-03-07
Payer: MEDICARE

## 2025-03-07 DIAGNOSIS — Z96.651 S/P TKR (TOTAL KNEE REPLACEMENT) USING CEMENT, RIGHT: Primary | ICD-10-CM

## 2025-03-07 RX ORDER — ONDANSETRON 4 MG/1
4 TABLET, ORALLY DISINTEGRATING ORAL EVERY 8 HOURS PRN
Qty: 20 TABLET | Refills: 0 | Status: SHIPPED | OUTPATIENT
Start: 2025-03-07

## 2025-03-07 SDOH — HEALTH STABILITY: PHYSICAL HEALTH: EXERCISE TYPE: ACCORDING TO TKR PROTOCOL

## 2025-03-07 ASSESSMENT — ENCOUNTER SYMPTOMS
ARTHRALGIAS: 1
MUSCLE WEAKNESS: 1
DEPRESSION: 0
LOSS OF SENSATION IN FEET: 0
NAUSEA: 1
OCCASIONAL FEELINGS OF UNSTEADINESS: 1
PAIN LOCATION - EXACERBATING FACTORS: ACTIVITY
LIMITED RANGE OF MOTION: 1

## 2025-03-07 ASSESSMENT — ACTIVITIES OF DAILY LIVING (ADL)
ENTERING_EXITING_HOME: ONE PERSON
OASIS_M1830: 05
AMBULATION ASSISTANCE ON FLAT SURFACES: 1
AMBULATION ASSISTANCE: STAND BY ASSIST
CURRENT_FUNCTION: ONE PERSON
CURRENT_FUNCTION: STAND BY ASSIST
AMBULATION ASSISTANCE: ONE PERSON

## 2025-03-07 NOTE — CASE COMMUNICATION
Patient seen in home for PT SOC visit and treatment.  initiated HEP teaching,post op precautions and home safety.Meds reconciled.

## 2025-03-10 ENCOUNTER — HOME CARE VISIT (OUTPATIENT)
Dept: HOME HEALTH SERVICES | Facility: HOME HEALTH | Age: 65
End: 2025-03-10
Payer: MEDICARE

## 2025-03-10 VITALS — HEART RATE: 78 BPM | RESPIRATION RATE: 14 BRPM | OXYGEN SATURATION: 99 % | TEMPERATURE: 98.6 F

## 2025-03-10 PROCEDURE — G0151 HHCP-SERV OF PT,EA 15 MIN: HCPCS | Mod: HHH

## 2025-03-10 SDOH — HEALTH STABILITY: PHYSICAL HEALTH: EXERCISE TYPE: ACCORDING TO TKR PROTOCOL

## 2025-03-10 ASSESSMENT — PAIN SCALES - PAIN ASSESSMENT IN ADVANCED DEMENTIA (PAINAD)
BODYLANGUAGE: 2
FACIALEXPRESSION: 2
CONSOLABILITY: 1 - DISTRACTED OR REASSURED BY VOICE OR TOUCH.
BREATHING: 0
TOTALSCORE: 6
CONSOLABILITY: 1
NEGVOCALIZATION: 1 - OCCASIONAL MOAN OR GROAN. LOW-LEVEL SPEECH WITH A NEGATIVE OR DISAPPROVING QUALITY.
BODYLANGUAGE: 2 - RIGID. FISTS CLENCHED. KNEES PULLED UP. PULLING OR PUSHING AWAY. STRIKING OUT.
NEGVOCALIZATION: 1
FACIALEXPRESSION: 2 - FACIAL GRIMACING.

## 2025-03-10 ASSESSMENT — ACTIVITIES OF DAILY LIVING (ADL)
CURRENT_FUNCTION: STAND BY ASSIST
AMBULATION ASSISTANCE ON FLAT SURFACES: 1
AMBULATION ASSISTANCE ON UNEVEN SURFACES: 1
AMBULATION ASSISTANCE: STAND BY ASSIST

## 2025-03-10 ASSESSMENT — ENCOUNTER SYMPTOMS
HIGHEST PAIN SEVERITY IN PAST 24 HOURS: 9/10
NAUSEA: 1
SUBJECTIVE PAIN PROGRESSION: GRADUALLY IMPROVING
VOMITING: 1
MUSCLE WEAKNESS: 1
PAIN LOCATION - PAIN QUALITY: ACHING
LOWEST PAIN SEVERITY IN PAST 24 HOURS: 5/10
PERSON REPORTING PAIN: PATIENT
LIMITED RANGE OF MOTION: 1
OCCASIONAL FEELINGS OF UNSTEADINESS: 0
PAIN LOCATION: RIGHT KNEE
PAIN LOCATION - PAIN FREQUENCY: FREQUENT
PAIN SEVERITY GOAL: 0/10
ARTHRALGIAS: 1
PAIN: 1
PAIN LOCATION - PAIN SEVERITY: 7/10

## 2025-03-11 DIAGNOSIS — Z96.651 S/P TKR (TOTAL KNEE REPLACEMENT) USING CEMENT, RIGHT: Primary | ICD-10-CM

## 2025-03-11 RX ORDER — HYDROCODONE BITARTRATE AND ACETAMINOPHEN 5; 325 MG/1; MG/1
1 TABLET ORAL EVERY 6 HOURS PRN
Qty: 28 TABLET | Refills: 0 | Status: SHIPPED | OUTPATIENT
Start: 2025-03-11 | End: 2025-03-18

## 2025-03-11 NOTE — CASE COMMUNICATION
Patient was seen for PT follow up visit.Patient is doing well with ROM and mobility,but is struggling with pain management due to nausea induced from narcotics.Patient has history not tolerating percocet..

## 2025-03-11 NOTE — PROGRESS NOTES
Switched patient from oxycodone to norco due to nausea.    Jeannie Reich MPAS, PA-C, ATC  Orthopedic Physician Assisant  Adult Reconstruction and Total Joint Replacement  General Orthopedics  Department of Orthopaedic Surgery  [place] [phone]

## 2025-03-13 ENCOUNTER — HOME CARE VISIT (OUTPATIENT)
Dept: HOME HEALTH SERVICES | Facility: HOME HEALTH | Age: 65
End: 2025-03-13
Payer: MEDICARE

## 2025-03-13 VITALS — HEART RATE: 78 BPM | OXYGEN SATURATION: 98 %

## 2025-03-13 PROCEDURE — G0151 HHCP-SERV OF PT,EA 15 MIN: HCPCS | Mod: HHH

## 2025-03-13 SDOH — HEALTH STABILITY: PHYSICAL HEALTH: EXERCISE TYPE: ACCORDING TO TKR PROTOCOL

## 2025-03-13 ASSESSMENT — PAIN SCALES - PAIN ASSESSMENT IN ADVANCED DEMENTIA (PAINAD)
FACIALEXPRESSION: 0
BODYLANGUAGE: 0
BREATHING: 0
BODYLANGUAGE: 0 - RELAXED.
TOTALSCORE: 0
CONSOLABILITY: 0 - NO NEED TO CONSOLE.
NEGVOCALIZATION: 0
CONSOLABILITY: 0
FACIALEXPRESSION: 0 - SMILING OR INEXPRESSIVE.
NEGVOCALIZATION: 0 - NONE.

## 2025-03-13 ASSESSMENT — ACTIVITIES OF DAILY LIVING (ADL)
CURRENT_FUNCTION: STAND BY ASSIST
AMBULATION ASSISTANCE: ONE PERSON
AMBULATION ASSISTANCE ON FLAT SURFACES: 1
CURRENT_FUNCTION: ONE PERSON
AMBULATION ASSISTANCE ON UNEVEN SURFACES: 1
AMBULATION ASSISTANCE: STAND BY ASSIST

## 2025-03-13 ASSESSMENT — ENCOUNTER SYMPTOMS
SUBJECTIVE PAIN PROGRESSION: GRADUALLY IMPROVING
LOWEST PAIN SEVERITY IN PAST 24 HOURS: 2/10
LIMITED RANGE OF MOTION: 1
OCCASIONAL FEELINGS OF UNSTEADINESS: 0
PAIN LOCATION: RIGHT KNEE
PAIN LOCATION - PAIN SEVERITY: 5/10
MUSCLE WEAKNESS: 1
ARTHRALGIAS: 1
PAIN: 1
HIGHEST PAIN SEVERITY IN PAST 24 HOURS: 8/10
PAIN SEVERITY GOAL: 0/10

## 2025-03-13 NOTE — CASE COMMUNICATION
Patient seen for PT follow up visit in home..Aquacell bandage removed 8 days post op...wound clean and dry..Reviewed pain management and correct sequence of pain relieving narcotics.  Reviewed HEP according to TKR protocol,home safety .Patient is still having difficulty with pain management,but moving well.

## 2025-03-17 ENCOUNTER — APPOINTMENT (OUTPATIENT)
Dept: HOME HEALTH SERVICES | Facility: HOME HEALTH | Age: 65
End: 2025-03-17
Payer: MEDICARE

## 2025-03-17 VITALS — HEART RATE: 78 BPM | TEMPERATURE: 98.6 F | RESPIRATION RATE: 14 BRPM

## 2025-03-17 PROCEDURE — G0151 HHCP-SERV OF PT,EA 15 MIN: HCPCS | Mod: HHH

## 2025-03-17 SDOH — HEALTH STABILITY: PHYSICAL HEALTH: EXERCISE TYPE: ACCORDING TO TKR PROTOCOL

## 2025-03-17 ASSESSMENT — PAIN SCALES - PAIN ASSESSMENT IN ADVANCED DEMENTIA (PAINAD)
NEGVOCALIZATION: 0
BODYLANGUAGE: 0
FACIALEXPRESSION: 2
BODYLANGUAGE: 0 - RELAXED.
CONSOLABILITY: 0 - NO NEED TO CONSOLE.
NEGVOCALIZATION: 0 - NONE.
CONSOLABILITY: 0
FACIALEXPRESSION: 2 - FACIAL GRIMACING.
TOTALSCORE: 2
BREATHING: 0

## 2025-03-17 ASSESSMENT — ENCOUNTER SYMPTOMS
OCCASIONAL FEELINGS OF UNSTEADINESS: 0
DEPRESSION: 0
PERSON REPORTING PAIN: PATIENT
PAIN: 1
PAIN LOCATION: RIGHT KNEE
PAIN SEVERITY GOAL: 0/10
HIGHEST PAIN SEVERITY IN PAST 24 HOURS: 4/10
SUBJECTIVE PAIN PROGRESSION: GRADUALLY IMPROVING
LOWEST PAIN SEVERITY IN PAST 24 HOURS: 2/10
PAIN LOCATION - PAIN SEVERITY: 3/10
LOSS OF SENSATION IN FEET: 0
LIMITED RANGE OF MOTION: 1
MUSCLE WEAKNESS: 1

## 2025-03-17 ASSESSMENT — ACTIVITIES OF DAILY LIVING (ADL)
AMBULATION ASSISTANCE ON UNEVEN SURFACES: 1
AMBULATION ASSISTANCE: STAND BY ASSIST

## 2025-03-21 ENCOUNTER — HOME CARE VISIT (OUTPATIENT)
Dept: HOME HEALTH SERVICES | Facility: HOME HEALTH | Age: 65
End: 2025-03-21
Payer: MEDICARE

## 2025-03-21 VITALS — RESPIRATION RATE: 14 BRPM | HEART RATE: 74 BPM | TEMPERATURE: 98.6 F

## 2025-03-21 PROCEDURE — G0151 HHCP-SERV OF PT,EA 15 MIN: HCPCS | Mod: HHH

## 2025-03-21 SDOH — HEALTH STABILITY: PHYSICAL HEALTH: EXERCISE TYPE: ACCORDING TO TKR PROTOCOL

## 2025-03-21 ASSESSMENT — ENCOUNTER SYMPTOMS
PAIN SEVERITY GOAL: 0/10
PERSON REPORTING PAIN: PATIENT
PAIN LOCATION - PAIN SEVERITY: 3/10
PAIN LOCATION: RIGHT KNEE
LOWEST PAIN SEVERITY IN PAST 24 HOURS: 3/10
MUSCLE WEAKNESS: 1
ARTHRALGIAS: 1
LIMITED RANGE OF MOTION: 1
PAIN: 1
HIGHEST PAIN SEVERITY IN PAST 24 HOURS: 8/10
SUBJECTIVE PAIN PROGRESSION: GRADUALLY IMPROVING
OCCASIONAL FEELINGS OF UNSTEADINESS: 0

## 2025-03-21 ASSESSMENT — PAIN SCALES - PAIN ASSESSMENT IN ADVANCED DEMENTIA (PAINAD)
CONSOLABILITY: 0
TOTALSCORE: 2
FACIALEXPRESSION: 2 - FACIAL GRIMACING.
FACIALEXPRESSION: 2
NEGVOCALIZATION: 0
CONSOLABILITY: 0 - NO NEED TO CONSOLE.
BODYLANGUAGE: 0
NEGVOCALIZATION: 0 - NONE.
BREATHING: 0
BODYLANGUAGE: 0 - RELAXED.

## 2025-03-21 ASSESSMENT — ACTIVITIES OF DAILY LIVING (ADL)
OASIS_M1830: 00
AMBULATION ASSISTANCE ON FLAT SURFACES: 1
HOME_HEALTH_OASIS: 00

## 2025-03-21 NOTE — CASE COMMUNICATION
Patient was seen in home for PT agency discharge visit.Patient has met goals for home care,excellent progress with mobility and ROM

## 2025-03-24 ENCOUNTER — EVALUATION (OUTPATIENT)
Dept: PHYSICAL THERAPY | Facility: CLINIC | Age: 65
End: 2025-03-24
Payer: MEDICARE

## 2025-03-24 DIAGNOSIS — M17.11 UNILATERAL PRIMARY OSTEOARTHRITIS, RIGHT KNEE: ICD-10-CM

## 2025-03-24 PROCEDURE — 97110 THERAPEUTIC EXERCISES: CPT | Mod: GP | Performed by: PHYSICAL THERAPIST

## 2025-03-24 PROCEDURE — 97161 PT EVAL LOW COMPLEX 20 MIN: CPT | Mod: GP | Performed by: PHYSICAL THERAPIST

## 2025-03-24 ASSESSMENT — ENCOUNTER SYMPTOMS
OCCASIONAL FEELINGS OF UNSTEADINESS: 0
DEPRESSION: 0
LOSS OF SENSATION IN FEET: 0

## 2025-03-24 NOTE — PROGRESS NOTES
Physical Therapy    Physical Therapy Evaluation and Treatment      Patient Name: Mary Kate Short  MRN: [id number]  Today's Date: 3/24/2025  Visit: 1  Insurance: Reviewed  Physician: Jeannie Reich PA-C  Problem List Items Addressed This Visit             ICD-10-CM    Unilateral primary osteoarthritis, right knee M17.11    Relevant Orders    Follow Up In Physical Therapy        Time Entry:   Time Calculation  Start Time: 0105  Stop Time: 0145  Time Calculation (min): 40 min  PT Evaluation Time Entry  PT Evaluation (Low) Time Entry: 30  PT Therapeutic Procedures Time Entry  Therapeutic Exercise Time Entry: 10      Assessment: Patient seen in PT for Initial Evaluation for knee pain and stiffness following TKA.   Patient presents with postural deviation  decreased knee ROM , decreased knee strength, knee tenderness, mod knee swelling, .  Functionally, patient  unable to do steps reciprocally or do strenuous ADL's.  Patient rates LEFS  at 63.75%.    PT Assessment  Rehab Prognosis: Good  Evaluation/Treatment Tolerance: Patient tolerated treatment well     Plan:  Continue with POC  TKA protocol, CP  OP PT Plan  PT Plan: Skilled PT  PT Frequency: 2 times per week  Duration: 6  Onset Date: 03/01/25  Certification Period Start Date: 03/24/25  Certification Period End Date: 06/22/25  Rehab Potential: Good  Plan of Care Agreement: Patient    Current Problem:   1. Unilateral primary osteoarthritis, right knee  Referral to Physical Therapy    Follow Up In Physical Therapy          Subjective    General: Patient with a history of right knee pain for 16 years.  Onset was insidious.  Loosely relates to being an athlete.   Patient treated with 2 meniscal surgery  Patient had a TKA on right on 3/5/25 .  Patient complains of pain in the region of the ant knee into shin, ache > sharp pain, 3/10 at worst last 7 days.  Patient's pain is worse with bending knee, sitting too long, am.  .  Functionally, patient is unable to do steps  reciprocally, able to walk 30 minutes, doing light ADL's - nothing physical. .     Precautions: TKA precautions, HTN       Prior Level of Function: no limits       Objective     Postural deviation: holds right knee slightly flexed   right knee ROM to -10 extension and 95 flexion  Right  Knee strength 3+/5 quads and 4/5 hamstrings  Gait deviation: decreased heel strike on right   Tender to palpation at the joint line  Balance: SLS 10 sec+ bilaterally  Special Tests: negative homans  Mod swelling right knee   Well healing scar    Outcome Measures:  Other Measures  Lower Extremity Funtional Score (LEFS): 51     Treatments:  Patient instructed with HEP including: step stretch, AA knee flexion, knee extension over bolster 20X each (10 minutes)    EDUCATION: HEP       Goals:  Active       PT Problem       PT Goal 1       Start:  03/24/25    Expected End:  06/22/25       Knee pain 1/10 or less         PT Goal 2       Start:  03/24/25    Expected End:  06/22/25       Improve LEFS by 15%         PT Goal 3       Start:  03/24/25    Expected End:  06/22/25       Normal gait without device  Steps reciprocal with function         PT Goal 4       Start:  03/24/25    Expected End:  06/22/25       0-115 knee ROM          PT Goal 5       Start:  03/24/25    Expected End:  06/22/25       5/5 knee strength

## 2025-03-26 ENCOUNTER — TREATMENT (OUTPATIENT)
Dept: PHYSICAL THERAPY | Facility: CLINIC | Age: 65
End: 2025-03-26
Payer: MEDICARE

## 2025-03-26 DIAGNOSIS — M17.11 UNILATERAL PRIMARY OSTEOARTHRITIS, RIGHT KNEE: ICD-10-CM

## 2025-03-26 PROCEDURE — 97110 THERAPEUTIC EXERCISES: CPT | Mod: GP | Performed by: PHYSICAL THERAPIST

## 2025-03-26 NOTE — PROGRESS NOTES
"Physical Therapy    Physical Therapy Treatment    Patient Name: Mary Kate Short  MRN: [id number]  Today's Date: 3/26/2025  Time Entry:   Time Calculation  Start Time: 0320  Stop Time: 0405  Time Calculation (min): 45 min     PT Therapeutic Procedures Time Entry  Therapeutic Exercise Time Entry: 40  PT Modalities Time Entry  Hot/Cold Pack Time Entry: 5    Assessment: Good understanding and compliance with HEP.       Plan:  Continue with HEP        Current Problem  1. Unilateral primary osteoarthritis, right knee  Follow Up In Physical Therapy          General  -3-4/10 at worst last 2 days  -going to [employer] [facility] [place] in october       Subjective    Precautions: TKA precautions.HTN       Objective     Independent with HEP      Treatments:  SciFit stepper 6 minutes   Practiced heel to toe walking 20' X 4  Step stretch 20X  Step ups 6\" 20X  Up and down 6\" steps reciprocally X 3  Theraball HS with strap 20X   Reviewed HEP  Knee extension over bolster 20X  (40 minutes)    CP to the right knee 5 minutes)    OP EDUCATION: HEP       Goals:  Active       PT Problem       PT Goal 1       Start:  03/24/25    Expected End:  06/22/25       Knee pain 1/10 or less         PT Goal 2       Start:  03/24/25    Expected End:  06/22/25       Improve LEFS by 15%         PT Goal 3       Start:  03/24/25    Expected End:  06/22/25       Normal gait without device  Steps reciprocal with function         PT Goal 4       Start:  03/24/25    Expected End:  06/22/25       0-115 knee ROM          PT Goal 5       Start:  03/24/25    Expected End:  06/22/25       5/5 knee strength            "

## 2025-03-27 ENCOUNTER — APPOINTMENT (OUTPATIENT)
Dept: DERMATOLOGY | Facility: CLINIC | Age: 65
End: 2025-03-27
Payer: COMMERCIAL

## 2025-04-01 ENCOUNTER — APPOINTMENT (OUTPATIENT)
Dept: PRIMARY CARE | Facility: CLINIC | Age: 65
End: 2025-04-01
Payer: MEDICARE

## 2025-04-01 VITALS
SYSTOLIC BLOOD PRESSURE: 122 MMHG | DIASTOLIC BLOOD PRESSURE: 70 MMHG | BODY MASS INDEX: 29.19 KG/M2 | HEIGHT: 64 IN | HEART RATE: 80 BPM | OXYGEN SATURATION: 96 % | WEIGHT: 171 LBS

## 2025-04-01 DIAGNOSIS — Z79.899 CONTROLLED SUBSTANCE AGREEMENT SIGNED: ICD-10-CM

## 2025-04-01 DIAGNOSIS — Z12.31 SCREENING MAMMOGRAM FOR BREAST CANCER: ICD-10-CM

## 2025-04-01 DIAGNOSIS — F32.5 MAJOR DEPRESSIVE DISORDER WITH SINGLE EPISODE, IN FULL REMISSION (CMS-HCC): ICD-10-CM

## 2025-04-01 DIAGNOSIS — F41.9 ANXIETY: ICD-10-CM

## 2025-04-01 DIAGNOSIS — G47.33 OSA ON CPAP: Primary | ICD-10-CM

## 2025-04-01 PROCEDURE — 1036F TOBACCO NON-USER: CPT

## 2025-04-01 PROCEDURE — 3074F SYST BP LT 130 MM HG: CPT

## 2025-04-01 PROCEDURE — G2211 COMPLEX E/M VISIT ADD ON: HCPCS

## 2025-04-01 PROCEDURE — 3008F BODY MASS INDEX DOCD: CPT

## 2025-04-01 PROCEDURE — 99213 OFFICE O/P EST LOW 20 MIN: CPT

## 2025-04-01 PROCEDURE — 1123F ACP DISCUSS/DSCN MKR DOCD: CPT

## 2025-04-01 PROCEDURE — 3078F DIAST BP <80 MM HG: CPT

## 2025-04-01 PROCEDURE — 1160F RVW MEDS BY RX/DR IN RCRD: CPT

## 2025-04-01 PROCEDURE — 1159F MED LIST DOCD IN RCRD: CPT

## 2025-04-01 RX ORDER — LORAZEPAM 0.5 MG/1
0.5 TABLET ORAL 2 TIMES DAILY PRN
Qty: 60 TABLET | Refills: 0 | Status: SHIPPED | OUTPATIENT
Start: 2025-04-01 | End: 2025-05-01

## 2025-04-01 ASSESSMENT — PATIENT HEALTH QUESTIONNAIRE - PHQ9
SUM OF ALL RESPONSES TO PHQ9 QUESTIONS 1 AND 2: 0
2. FEELING DOWN, DEPRESSED OR HOPELESS: NOT AT ALL
1. LITTLE INTEREST OR PLEASURE IN DOING THINGS: NOT AT ALL

## 2025-04-01 ASSESSMENT — ENCOUNTER SYMPTOMS
DEPRESSION: 0
LOSS OF SENSATION IN FEET: 0
OCCASIONAL FEELINGS OF UNSTEADINESS: 0

## 2025-04-01 NOTE — PROGRESS NOTES
Primary Care Provider: ILENE Funk    Subjective   Mary Kate Short is a 64 y.o. female who presents for Follow-up (CPAP).    HPI     FUV    PMH of HTN, HLD, hypothyroidism, depression and anxiety.   Healthcare POA: Socorro Limon- spouse.   Mammogram: due 12/19/2025.  Colonoscopy: UTD; due 6/30/2028.    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 Lea    OARRS:  ILENE Funk on 4/1/2025  2:02 PM  I have personally reviewed the OARRS report for Mary Kate Short. I have considered the risks of abuse, dependence, addiction and diversion and I believe that it is clinically appropriate for Mary Kate Short to be prescribed this medication    Is the patient prescribed a combination of a benzodiazepine and opioid?  No    Last Urine Drug Screen / ordered today: Yes  No results found for this or any previous visit (from the past 8760 hours).  pending    Controlled Substance Agreement: signed today 4/1/2025  Reviewed Controlled Substance Agreement including but not limited to the benefits, risks, and alternatives to treatment with a Controlled Substance medication(s).    Benzodiazepines:  What is the patient's goal of therapy? Reduce symptoms of anxiety and improve quality of life  Is this being achieved with current treatment? yes  VIRGIE-7:  No data recorded    Activities of Daily Living:   Is your overall impression that this patient is benefiting (symptom reduction outweighs side effects) from benzodiazepine therapy? Yes     1. Physical Functioning: Better  2. Family Relationship: Better  3. Social Relationship: Better  4. Mood:  "Better  5. Sleep Patterns: Better  6. Overall Function: Better      Review of Systems  The remainder of the ROS was negative unless otherwise stated in the HPI.       Objective   /70   Pulse 80   Ht 1.626 m (5' 4\")   Wt 77.6 kg (171 lb)   LMP  (LMP Unknown)   SpO2 96%   BMI 29.35 kg/m²     Physical Exam  Vitals reviewed.   Constitutional:       General: She is not in acute distress.     Appearance: Normal appearance. She is normal weight. She is not ill-appearing, toxic-appearing or diaphoretic.   HENT:      Head: Normocephalic and atraumatic.      Nose: Nose normal.   Eyes:      Conjunctiva/sclera: Conjunctivae normal.   Cardiovascular:      Rate and Rhythm: Normal rate and regular rhythm.      Pulses: Normal pulses.      Heart sounds: Normal heart sounds. No murmur heard.     No friction rub. No gallop.   Pulmonary:      Effort: Pulmonary effort is normal. No respiratory distress.      Breath sounds: Normal breath sounds.   Musculoskeletal:      Cervical back: Normal range of motion and neck supple.   Lymphadenopathy:      Cervical: No cervical adenopathy.   Skin:     General: Skin is warm and dry.   Neurological:      General: No focal deficit present.      Mental Status: She is alert and oriented to person, place, and time. Mental status is at baseline.   Psychiatric:         Mood and Affect: Mood normal.         Behavior: Behavior normal.         Thought Content: Thought content normal.         Judgment: Judgment normal.         Assessment/Plan       FUV    PMH of HTN, HLD, hypothyroidism, depression and anxiety.   Healthcare POA: Socorro Limon- spouse.   Colonoscopy: UTD; due 6/30/2028.    Had a total knee replacement- right; going well    Screening for breast cancer  Mammogram: due 12/19/2025.; ORDERED     JOSUÉ  Stable  Greatly improved  Is wearing CPAP every night; wears at least 5 hours a night, sometimes more, 8 hrs  She feels better during the day; wakes up feeling well rested now  Cleans the " supplies regularly    Depression & Anxiety & controlled substance signed  Stable  C/w celexa 20mg daily  For anxiety also lorazepam 0.5mg PRN, she takes about 1/2 tablet every few months PRN  Last rx was sent 2/21/2024 by her previously PCP Dr. Lea  Sent new rx of the lorazepam   OARRS reviewed, no red flags, sent rx  [employer] 4/1/2025        Follow up in 3-4 months or sooner if needed

## 2025-04-03 LAB
AMPHETAMINES UR QL: NEGATIVE NG/ML
BARBITURATES UR QL: NEGATIVE NG/ML
BENZODIAZ UR QL: NEGATIVE NG/ML
BZE UR QL: NEGATIVE NG/ML
CODEINE UR-MCNC: NEGATIVE NG/ML
CREAT UR-MCNC: 111.3 MG/DL
DRUG SCREEN COMMENT UR-IMP: ABNORMAL
DRUG SCREEN COMMENT UR-IMP: ABNORMAL
HYDROCODONE UR-MCNC: NEGATIVE NG/ML
HYDROMORPHONE UR-MCNC: NEGATIVE NG/ML
METHADONE UR QL: NEGATIVE NG/ML
MORPHINE UR-MCNC: NEGATIVE NG/ML
NORHYDROCODONE UR CFM-MCNC: NEGATIVE NG/ML
NOROXYCODONE UR CFM-MCNC: 284 NG/ML
OPIATES UR QL: ABNORMAL NG/ML
OXIDANTS UR QL: NEGATIVE MCG/ML
OXYCODONE UR QL: POSITIVE NG/ML
OXYCODONE UR-MCNC: NEGATIVE NG/ML
OXYMORPHONE UR-MCNC: NEGATIVE NG/ML
PCP UR QL: NEGATIVE NG/ML
PH UR: 7 [PH] (ref 4.5–9)
QUEST NOTES AND COMMENTS: ABNORMAL
THC UR QL: NEGATIVE NG/ML

## 2025-04-08 ENCOUNTER — TREATMENT (OUTPATIENT)
Dept: PHYSICAL THERAPY | Facility: CLINIC | Age: 65
End: 2025-04-08
Payer: MEDICARE

## 2025-04-08 DIAGNOSIS — M17.11 UNILATERAL PRIMARY OSTEOARTHRITIS, RIGHT KNEE: ICD-10-CM

## 2025-04-08 PROCEDURE — 97110 THERAPEUTIC EXERCISES: CPT | Mod: GP,CQ

## 2025-04-08 ASSESSMENT — PAIN - FUNCTIONAL ASSESSMENT: PAIN_FUNCTIONAL_ASSESSMENT: 0-10

## 2025-04-08 ASSESSMENT — PAIN SCALES - GENERAL: PAINLEVEL_OUTOF10: 2

## 2025-04-08 NOTE — PROGRESS NOTES
"Physical Therapy    Physical Therapy Treatment    Patient Name: Mary Kate Short  MRN: [id number]  Today's Date: 4/8/2025  Time Entry:   Time Calculation  Start Time: 0800  Stop Time: 0845  Time Calculation (min): 45 min     PT Therapeutic Procedures Time Entry  Therapeutic Exercise Time Entry: 45       Assessment:  PT Assessment  Evaluation/Treatment Tolerance: Patient tolerated treatment well  Patient shows good tolerance to given therex and over pressure being applied for stretching; flexion better than extension. Descending steps initially with increased pain that gradually lessened. Shows good understanding of stretching; review of HEP.     Plan:  Continue with HEP    OP PT Plan  PT Plan: Skilled PT  PT Frequency: 2 times per week  Duration: 6  Onset Date: 03/01/25  Certification Period Start Date: 03/24/25  Certification Period End Date: 06/22/25  Rehab Potential: Good  Plan of Care Agreement: Patient    Current Problem  1. Unilateral primary osteoarthritis, right knee  Follow Up In Physical Therapy        General     Subjective   Pain level currently is a 2/10, mainly stiff  I missed last visit so I made sure to do my exercises  HEP completed twice daily  No further updates/concerns    Precautions: TKA precautions.HTN       Objective   Independent with HEP      Treatments:  SciFit stepper x 7 minutes   Step stretch 20X  Step calf stretch with right quad set 3 x 30 sec  Step ups 6\" 20X  Up and down 6\" steps reciprocally X 5  Mini squat in // bars x 20  Heel raises x 20  Heel slides with strap 20 x 5 sec  Seated knee extension/hamstring stretch with OP 3 x 30 sec  Knee extension over bolster 20X    (45 minutes)    CP to the right knee 5 minutes)    OP EDUCATION: HEP     Goals:  Active       PT Problem       PT Goal 1       Start:  03/24/25    Expected End:  06/22/25       Knee pain 1/10 or less         PT Goal 2       Start:  03/24/25    Expected End:  06/22/25       Improve LEFS by 15%         PT Goal 3       " Start:  03/24/25    Expected End:  06/22/25       Normal gait without device  Steps reciprocal with function         PT Goal 4       Start:  03/24/25    Expected End:  06/22/25       0-115 knee ROM          PT Goal 5       Start:  03/24/25    Expected End:  06/22/25       5/5 knee strength

## 2025-04-11 ENCOUNTER — TREATMENT (OUTPATIENT)
Dept: PHYSICAL THERAPY | Facility: CLINIC | Age: 65
End: 2025-04-11
Payer: MEDICARE

## 2025-04-11 DIAGNOSIS — M17.11 UNILATERAL PRIMARY OSTEOARTHRITIS, RIGHT KNEE: ICD-10-CM

## 2025-04-11 PROCEDURE — 97110 THERAPEUTIC EXERCISES: CPT | Mod: GP,CQ

## 2025-04-11 ASSESSMENT — PAIN SCALES - GENERAL: PAINLEVEL_OUTOF10: 4

## 2025-04-11 ASSESSMENT — PAIN - FUNCTIONAL ASSESSMENT: PAIN_FUNCTIONAL_ASSESSMENT: 0-10

## 2025-04-11 NOTE — PROGRESS NOTES
"Physical Therapy    Physical Therapy Treatment    Patient Name: Mary Kate Short  MRN: [id number]  Today's Date: 4/11/2025  Time Entry:   Time Calculation  Start Time: 0145  Stop Time: 0230  Time Calculation (min): 45 min     PT Therapeutic Procedures Time Entry  Therapeutic Exercise Time Entry: 45     Visit: 4  Insurance: Reviewed  Physician: Jeannie Reich PA-C    Assessment:  PT Assessment  Evaluation/Treatment Tolerance: Patient tolerated treatment well  Updated measurements -7-105  Patient tolerates therex well today; with complaint in knee of instability during lateral walking and monster walking. Balance added today and completed well, no complaint of instability during. Strength and ROM progressing well. Review of stretches for HEP.     Plan:  Continue with HEP    OP PT Plan  PT Plan: Skilled PT  PT Frequency: 2 times per week  Duration: 6  Onset Date: 03/01/25  Certification Period Start Date: 03/24/25  Certification Period End Date: 06/22/25  Rehab Potential: Good  Plan of Care Agreement: Patient    Current Problem  1. Unilateral primary osteoarthritis, right knee  Follow Up In Physical Therapy        General     Subjective   I feel very stiff today  Pain is currently at a 4/10  Traveling down to the shin  HEP completed 2-3 times per day    Precautions: TKA precautions.HTN       Objective   Independent with HEP      Treatments:  SciFit stepper x 7 minutes   Step stretch 20X  Step calf stretch with right quad set 3 x 30 sec  Seated knee extension/hamstring stretch with OP 3 x 30 sec  Mini squat in // bars x 20  March walk in // bars with pause in SLS x 5 D/B  Up and down 6\" steps reciprocally X 5  Lateral walking RTB along white tape x 3  Monster walking RTB along white tape x 3  Heel slides with strap 20 x 5 sec  Knee extension over bolster 20X    (45 minutes)    CP to the right knee 5 minutes)    OP EDUCATION: HEP     Goals:  Active       PT Problem       PT Goal 1       Start:  03/24/25    Expected End:  " 06/22/25       Knee pain 1/10 or less         PT Goal 2       Start:  03/24/25    Expected End:  06/22/25       Improve LEFS by 15%         PT Goal 3       Start:  03/24/25    Expected End:  06/22/25       Normal gait without device  Steps reciprocal with function         PT Goal 4       Start:  03/24/25    Expected End:  06/22/25       0-115 knee ROM          PT Goal 5       Start:  03/24/25    Expected End:  06/22/25       5/5 knee strength

## 2025-04-15 ENCOUNTER — TREATMENT (OUTPATIENT)
Dept: PHYSICAL THERAPY | Facility: CLINIC | Age: 65
End: 2025-04-15
Payer: MEDICARE

## 2025-04-15 DIAGNOSIS — M17.11 UNILATERAL PRIMARY OSTEOARTHRITIS, RIGHT KNEE: ICD-10-CM

## 2025-04-15 PROCEDURE — 97110 THERAPEUTIC EXERCISES: CPT | Mod: GP | Performed by: PHYSICAL THERAPIST

## 2025-04-15 NOTE — PROGRESS NOTES
"Physical Therapy    Physical Therapy Treatment    Patient Name: Mary Kate Short  MRN: [id number]  Today's Date: 4/15/2025  Time Entry:   Time Calculation  Start Time: 0225  Stop Time: 0315  Time Calculation (min): 50 min     PT Therapeutic Procedures Time Entry  Therapeutic Exercise Time Entry: 40  PT Modalities Time Entry  Hot/Cold Pack Time Entry: 10  Visit: 5  Insurance: Reviewed  Physician: Jeannie Reich PA-C    Assessment: Patient with increased pain after last PT visit.  Avoided monster walks and lateral walks.  Patient's pain down to 4/10 currently.  Advised to use ice pack frequently at home.  Overall patient is doing well with normal gait and doing most normal ADL's.         Plan:  Continue with HEP    OP PT Plan  PT Plan: Skilled PT  PT Frequency: 2 times per week  Duration: 6  Onset Date: 03/01/25  Certification Period Start Date: 03/24/25  Certification Period End Date: 06/22/25  Rehab Potential: Good  Plan of Care Agreement: Patient    Current Problem  1. Unilateral primary osteoarthritis, right knee  Follow Up In Physical Therapy        General     Subjective   Knee pain on Saturday was an 8/10 - noted pain 4/10 at worst last 2 days  Doing all normal ADL's  Able to go to store    Precautions: TKA precautions.HTN       Objective          Treatments:  SciFit stepper x 10 minutes   Step stretch 20X  Step calf stretch with right quad set 3 x 30 sec  Seated knee extension/hamstring stretch with OP 3 x 30 sec  Mini squat in // bars x 20  March walk in // bars with pause in SLS x 5 D/B  Up and down 6\" steps reciprocally X 5  Lateral walking RTB along white tape x 3 - not today   Monster walking RTB along white tape x 3 - not today   Heel slides with strap 20 x 5 sec  Knee extension over bolster 20X    (40 minutes)    CP to the right knee 10 minutes)    OP EDUCATION: HEP     Goals:  Active       PT Problem       PT Goal 1       Start:  03/24/25    Expected End:  06/22/25       Knee pain 1/10 or less         PT " Goal 2       Start:  03/24/25    Expected End:  06/22/25       Improve LEFS by 15%         PT Goal 3       Start:  03/24/25    Expected End:  06/22/25       Normal gait without device  Steps reciprocal with function         PT Goal 4       Start:  03/24/25    Expected End:  06/22/25       0-115 knee ROM          PT Goal 5       Start:  03/24/25    Expected End:  06/22/25       5/5 knee strength

## 2025-04-17 ENCOUNTER — OFFICE VISIT (OUTPATIENT)
Dept: ORTHOPEDIC SURGERY | Facility: CLINIC | Age: 65
End: 2025-04-17
Payer: MEDICARE

## 2025-04-17 ENCOUNTER — HOSPITAL ENCOUNTER (OUTPATIENT)
Dept: RADIOLOGY | Facility: CLINIC | Age: 65
Discharge: HOME | End: 2025-04-17
Payer: MEDICARE

## 2025-04-17 ENCOUNTER — TREATMENT (OUTPATIENT)
Dept: PHYSICAL THERAPY | Facility: CLINIC | Age: 65
End: 2025-04-17
Payer: MEDICARE

## 2025-04-17 DIAGNOSIS — M17.11 UNILATERAL PRIMARY OSTEOARTHRITIS, RIGHT KNEE: ICD-10-CM

## 2025-04-17 DIAGNOSIS — Z96.651 TOTAL KNEE REPLACEMENT STATUS, RIGHT: ICD-10-CM

## 2025-04-17 PROCEDURE — 1159F MED LIST DOCD IN RCRD: CPT | Performed by: PHYSICIAN ASSISTANT

## 2025-04-17 PROCEDURE — 1123F ACP DISCUSS/DSCN MKR DOCD: CPT | Performed by: PHYSICIAN ASSISTANT

## 2025-04-17 PROCEDURE — 99211 OFF/OP EST MAY X REQ PHY/QHP: CPT | Performed by: PHYSICIAN ASSISTANT

## 2025-04-17 PROCEDURE — 97110 THERAPEUTIC EXERCISES: CPT | Mod: GP,CQ

## 2025-04-17 PROCEDURE — 73562 X-RAY EXAM OF KNEE 3: CPT | Mod: RIGHT SIDE | Performed by: RADIOLOGY

## 2025-04-17 PROCEDURE — 73562 X-RAY EXAM OF KNEE 3: CPT | Mod: RT

## 2025-04-17 ASSESSMENT — PAIN SCALES - GENERAL: PAINLEVEL_OUTOF10: 5 - MODERATE PAIN

## 2025-04-17 ASSESSMENT — PAIN - FUNCTIONAL ASSESSMENT
PAIN_FUNCTIONAL_ASSESSMENT: 0-10
PAIN_FUNCTIONAL_ASSESSMENT: 0-10

## 2025-04-17 NOTE — PROGRESS NOTES
"Physical Therapy    Physical Therapy Treatment    Patient Name: Mary Kate Short  MRN: [id number]  Today's Date: 4/17/2025  Time Entry:   Time Calculation  Start Time: 0145  Stop Time: 0230  Time Calculation (min): 45 min     PT Therapeutic Procedures Time Entry  Therapeutic Exercise Time Entry: 45     Visit: 6  Insurance: Reviewed  Physician: Jeannie Reich PA-C    Assessment:   PT Assessment  Evaluation/Treatment Tolerance: Patient tolerated treatment well  Does well with today's therex; improving tolerance to therex with overpressure and today's added manual with overpressure. Pain at end ranges, although end feel is good. No other noted issues today.     Plan:  Continue with HEP    OP PT Plan  PT Plan: Skilled PT  PT Frequency: 2 times per week  Duration: 6  Onset Date: 03/01/25  Certification Period Start Date: 03/24/25  Certification Period End Date: 06/22/25  Rehab Potential: Good  Plan of Care Agreement: Patient    Current Problem  1. Unilateral primary osteoarthritis, right knee  Follow Up In Physical Therapy        General     Subjective   Pain currently is a 4/10  Using icing machine as needed  HEP completed daily  Recent ortho check up, doing well, continue progressing ROM    Precautions: TKA precautions. HTN       Objective        Treatments:  SciFit stepper x 10 minutes   Step stretch 20X  Step calf stretch with right quad set 3 x 30 sec  Step calf raise x 20  Seated knee extension/hamstring stretch with OP 5 x 30 sec  Heel slides with strap 20 x 5 sec  Knee extension over bolster 20X  PT flexion/extension with OP 15 x 5 sec each  Bridges with hip adduction x 20  Hip abduction x 20 GT  March walk in // bars with pause in SLS x 5 D/B  Up and down 6\" steps reciprocally X 5    (40 minutes)    CP to the right knee 10 minutes)    OP EDUCATION: HEP     Goals:  Active       PT Problem       PT Goal 1       Start:  03/24/25    Expected End:  06/22/25       Knee pain 1/10 or less         PT Goal 2       Start:  " 03/24/25    Expected End:  06/22/25       Improve LEFS by 15%         PT Goal 3       Start:  03/24/25    Expected End:  06/22/25       Normal gait without device  Steps reciprocal with function         PT Goal 4       Start:  03/24/25    Expected End:  06/22/25       0-115 knee ROM          PT Goal 5       Start:  03/24/25    Expected End:  06/22/25       5/5 knee strength

## 2025-04-17 NOTE — PROGRESS NOTES
JIGNESH Stauffer, CORDELL, ATC  Adult Reconstruction and Joint Replacement Surgery  Phone: [phone]     Fax:[phone]            Chief Complaint   Patient presents with    Right Knee - Post-op       HPI:  Mary Kate Short is a pleasant 64 y.o. year-old female here for follow-up of their side: right total knee arthroplasty by Dr. Lim.  The patient is approximately 1 year(s) postop.The patient has no mechanical symptoms.  The patient has mild swelling and pain.   The patients wound has healed uneventfully.  The patient has been doing HEP and/or outpatient PT.  No complications postoperatively.      Review of Systems  Medical History[1]  Problem List[2]  Medication Documentation Review Audit       Reviewed by Florin Clarke MA (Medical Assistant) on 04/17/25 at 1145      Medication Order Taking? Sig Documenting Provider Last Dose Status   atorvastatin (Lipitor) 20 mg tablet [id number] No Take 1 tablet (20 mg) by mouth once daily. Santana Lea MD 3/4/2025 Morning Active   citalopram (CeleXA) 20 mg tablet [id number] No TAKE 2 TABLETS BY MOUTH DAILY X 60 TABS Leisa Shanon APRN-CNP 3/4/2025 Morning Active   hydroCHLOROthiazide (Microzide) 12.5 mg capsule [id number] No Take 1 capsule (12.5 mg) by mouth once daily. Santana Lea MD 3/4/2025 Morning Active   levothyroxine (Synthroid, Levoxyl) 112 mcg tablet [id number] No Take 1 tablet (112 mcg) by mouth once daily. Chandrakant Royal MD 3/4/2025 Morning Active   LORazepam (Ativan) 0.5 mg tablet [id number]  Take 1 tablet (0.5 mg) by mouth 2 times a day as needed for anxiety. Leisa Claudio APRN-CNP  Active   ondansetron ODT (Zofran-ODT) 4 mg disintegrating tablet [id number]  Dissolve 1 tablet (4 mg) in the mouth every 8 hours if needed for nausea or vomiting. Take one Tablet dissolved under tongue three times per day for nausea. Jeannie Reich PA-C  Active   pantoprazole (ProtoNix) 40 mg EC tablet [id number]  Take 1 tablet (40 mg) by mouth  once daily. Do not crush, chew, or split. Timothy Cortez MD   25 [phone]   polyethylene glycol (Glycolax, Miralax) 17 gram/dose powder [id number]  Mix 17 g of powder and drink once daily. Mix 1 cap (17g) into 8 ounces of fluid. Timothy Cortez MD  Active   triamcinolone (Kenalog) 0.1 % cream [id number] No Apply topically 2 times a day. To areas of eczema and itchy spots on body for up to 2 weeks   Patient taking differently: Apply  topically early in the morning. To areas of eczema and itchy spots on body for up to 2 weeks  Indications: contact dermatitis, a type of skin rash that occurs from contact with an offending substance    Ammy Gonzales MD 3/4/2025 Noon Active                  RX Allergies[3]  Social History     Socioeconomic History    Marital status:      Spouse name: Not on file    Number of children: Not on file    Years of education: Not on file    Highest education level: Not on file   Occupational History    Not on file   Tobacco Use    Smoking status: Never    Smokeless tobacco: Never   Substance and Sexual Activity    Alcohol use: Yes     Alcohol/week: 1.0 standard drink of alcohol     Types: 1 Glasses of wine per week    Drug use: Never    Sexual activity: Not on file   Other Topics Concern    Not on file   Social History Narrative    Not on file     Social Drivers of Health     Financial Resource Strain: Not on file   Food Insecurity: Not on file   Transportation Needs: No Transportation Needs (3/21/2025)    OASIS : Transportation     Lack of Transportation (Medical): No     Lack of Transportation (Non-Medical): No     Patient Unable or Declines to Respond: No   Physical Activity: Not on file   Stress: Not on file   Social Connections: Feeling Socially Integrated (3/21/2025)    OASIS : Social Isolation     Frequency of experiencing loneliness or isolation: Rarely   Intimate Partner Violence: Not on file   Housing Stability: Not on file     Surgical  History[4]    Physical Exam  side: right Knee  There were no vitals filed for this visit.  AxO x 3 in NAD.   Assistive Device: cane. Coordination and balance intact.  Normal bilateral upper and lower extremities.  No erythema, ecchymosis, temperature changes. No popliteal lymphadenopathy,  No overlying lesion  Mood: euthymic  Respirations non-labored  The incision is midline healing well with no signs of surrounding infection, dehiscence or drainage.   Neurovascular exam is at baseline.  No instability varus or valgus stressing the knee at 0, 30 or 60 degrees.  No instability in the AP plane at 90 degrees.  Range of motion: 0 degrees extension, 100 degrees flexion  5/5 hip flexion/knee extension/DF/PF/EHL  SILT in june/saph/ per/tib distribution   Extremities warm and well perfused.  No lower extremity calf tenderness, warmth or swelling. Lower extremity well perfused  2+ Femoral/DP/PT pulses bilaterally    Imaging:  No images are attached to the encounter.    I personally reviewed multiple views of the knee today in clinic. Status post side: right Total Knee aArthroplasty. The implant is well fixed, well aligned.  No evidence of lorene-implant fracture, lucency or dislocation.    Impression/Plan:  Mary Kate Short is doing well post-operatively and happy with the results of the operation.     S/P side: right Total Knee Arthroplasty  I talked with patient at length about activity precautions and progression of activities. The patient understands their permanent precautions. At this time, you may gradually increase your activities and get back to a normal, low-impact lifestyle. Please avoid running, jumping, and heavy lifting.      3. Continue HEP or outpatient PT, per protocol.    4. Continue Post-operative instructions.    5. Discussed the importance of dental prophylactic dental antibiotics lifelong. Patient may request medication refill through MyChart,       Pharmacy or surgeons office.    All questions  answered.    Follow-up 1 year with x-rays at next visit.    JIGNESH Stauffer, PA-C, ATC  Orthopedic Physician Assisant  Adult Reconstruction and Total Joint Replacement  General Orthopedics  Department of Orthopaedic Surgery  [place] [id number]  [employer] messaging preferred         [1]   Past Medical History:  Diagnosis Date    Cat bite 09/27/2024    Hyperlipidemia     Hypertension     Hypothyroidism     Nephrolithiasis     PONV (postoperative nausea and vomiting)     Sleep apnea    [2]   Patient Active Problem List  Diagnosis    1st MTP arthritis    Benign essential hypertension    Climacteric    Contusion of right forearm    Depression    Hot flashes    Hyperlipidemia    Hypothyroidism    Injury of right forearm    Injury of right shoulder    Kidney stones    Left foot pain    Left knee pain    Lumbar radiculopathy    Muscle ache    Right ankle pain    Shoulder pain    Sprain of anterior talofibular ligament of right ankle    Primary localized osteoarthrosis of left lower leg    Abdominal pain    Astigmatism    Combined form of age-related cataract, both eyes    Dry eyes, bilateral    Hyperopia    Myopia with presbyopia    Overweight with body mass index (BMI) of 29 to 29.9 in adult    Medicare annual wellness visit, subsequent    Primary osteoarthritis of right knee    Unilateral primary osteoarthritis, right knee    Total knee replacement status, right    JOSUÉ on CPAP    Anxiety   [3]   Allergies  Allergen Reactions    Penicillins Rash   [4]   Past Surgical History:  Procedure Laterality Date    OTHER SURGICAL HISTORY  10/14/2020    Oophorectomy bilateral    OTHER SURGICAL HISTORY  10/14/2020    Hysterectomy    OTHER SURGICAL HISTORY  10/14/2020    Appendectomy

## 2025-04-22 ENCOUNTER — APPOINTMENT (OUTPATIENT)
Dept: PHYSICAL THERAPY | Facility: CLINIC | Age: 65
End: 2025-04-22
Payer: MEDICARE

## 2025-04-23 ASSESSMENT — DERMATOLOGY QUALITY OF LIFE (QOL) ASSESSMENT
WHAT SINGLE SKIN CONDITION LISTED BELOW IS THE PATIENT ANSWERING THE QUALITY-OF-LIFE ASSESSMENT QUESTIONS ABOUT: NONE OF THE ABOVE
DATE THE QUALITY-OF-LIFE ASSESSMENT WAS COMPLETED: 67318
WHAT SINGLE SKIN CONDITION LISTED BELOW IS THE PATIENT ANSWERING THE QUALITY-OF-LIFE ASSESSMENT QUESTIONS ABOUT: NONE OF THE ABOVE

## 2025-04-24 ENCOUNTER — TREATMENT (OUTPATIENT)
Dept: PHYSICAL THERAPY | Facility: CLINIC | Age: 65
End: 2025-04-24
Payer: MEDICARE

## 2025-04-24 DIAGNOSIS — M17.11 UNILATERAL PRIMARY OSTEOARTHRITIS, RIGHT KNEE: ICD-10-CM

## 2025-04-24 PROCEDURE — 97110 THERAPEUTIC EXERCISES: CPT | Mod: GP | Performed by: PHYSICAL THERAPIST

## 2025-04-24 NOTE — PROGRESS NOTES
"Physical Therapy    Physical Therapy Treatment    Patient Name: Mary Kate Short  MRN: [id number]  Today's Date: 4/24/2025  Time Entry:   Time Calculation  Start Time: 0140  Stop Time: 0235  Time Calculation (min): 55 min     PT Therapeutic Procedures Time Entry  Therapeutic Exercise Time Entry: 40  PT Modalities Time Entry  Hot/Cold Pack Time Entry: 10  Visit: 6  Insurance: Reviewed  Physician: Jeannie Reich PA-C    Assessment:          Plan:  Continue with HEP    OP PT Plan  PT Plan: Skilled PT  PT Frequency: 2 times per week  Duration: 6  Onset Date: 03/01/25  Certification Period Start Date: 03/24/25  Certification Period End Date: 06/22/25  Rehab Potential: Good  Plan of Care Agreement: Patient    Current Problem  1. Unilateral primary osteoarthritis, right knee  Follow Up In Physical Therapy        General     Subjective   Pain currently is a 4/10  Able to walk 15 minutes   Using icing machine as needed  HEP completed daily  Recent ortho check up, doing well, continue progressing ROM    Precautions: TKA precautions. HTN       Objective        Treatments:  SciFit stepper x 10 minutes   Step stretch 20X  Step calf raise x 20  Seated knee extension/hamstring stretch with OP 5 x 30 sec  Theraball eel slides with strap 20 x 5 sec  Knee extension over bolster 20X  PT flexion/extension with OP 15 x 5 sec each  Hip abduction x 20 GT  March walk in // bars with pause in SLS x 5 D/B  Up and down 6\" steps reciprocally X 5    (45 minutes)    CP to the right knee 10 minutes)    OP EDUCATION: HEP     Goals:  Active       PT Problem       PT Goal 1       Start:  03/24/25    Expected End:  06/22/25       Knee pain 1/10 or less         PT Goal 2       Start:  03/24/25    Expected End:  06/22/25       Improve LEFS by 15%         PT Goal 3       Start:  03/24/25    Expected End:  06/22/25       Normal gait without device  Steps reciprocal with function         PT Goal 4       Start:  03/24/25    Expected End:  06/22/25       " 0-115 knee ROM          PT Goal 5       Start:  03/24/25    Expected End:  06/22/25 5/5 knee strength

## 2025-04-29 ENCOUNTER — TREATMENT (OUTPATIENT)
Dept: PHYSICAL THERAPY | Facility: CLINIC | Age: 65
End: 2025-04-29
Payer: MEDICARE

## 2025-04-29 DIAGNOSIS — M17.11 UNILATERAL PRIMARY OSTEOARTHRITIS, RIGHT KNEE: ICD-10-CM

## 2025-04-29 PROCEDURE — 97110 THERAPEUTIC EXERCISES: CPT | Mod: GP,CQ

## 2025-04-29 ASSESSMENT — PAIN SCALES - GENERAL: PAINLEVEL_OUTOF10: 0 - NO PAIN

## 2025-04-29 ASSESSMENT — PAIN - FUNCTIONAL ASSESSMENT: PAIN_FUNCTIONAL_ASSESSMENT: 0-10

## 2025-04-29 NOTE — PROGRESS NOTES
"Physical Therapy    Physical Therapy Treatment    Patient Name: Mary Kate Short  MRN: [id number]  Today's Date: 4/29/2025  Time Entry:   Time Calculation  Start Time: 0145  Stop Time: 0230  Time Calculation (min): 45 min     PT Therapeutic Procedures Time Entry  Therapeutic Exercise Time Entry: 45     Visit: 8  Insurance: Reviewed  Physician: Jeannie Reich PA-C    Assessment:   PT Assessment  Evaluation/Treatment Tolerance: Patient tolerated treatment well  With improved flexion today, 107, reviewed stretching intensity related to tolerance for increased over pressure. Remaining therex completed today with good grasp and no reports of pain increase. Advised patient icing prior to bed to try and settle symptoms to allow easier falling asleep.     Plan:  Continue with HEP    OP PT Plan  PT Plan: Skilled PT  PT Frequency: 2 times per week  Duration: 6  Onset Date: 03/01/25  Certification Period Start Date: 03/24/25  Certification Period End Date: 06/22/25  Rehab Potential: Good  Plan of Care Agreement: Patient    Current Problem  1. Unilateral primary osteoarthritis, right knee  Follow Up In Physical Therapy        General     Subjective   Having soreness at night, throbbing pain, 6/10  This interferes with my sleep  Currently only soreness  Performing HEP daily    Precautions: TKA precautions. HTN     Objective      Treatments:  SciFit stepper x 10 minutes   Step stretch 20X  Step calf stretch 2 x 30 seconds  Step calf raise x 20  Leg press #5 x 20  Knee extensions 10# x 20  Knee curls 15# x 20  Seated knee extension/hamstring stretch with OP 5 x 30 sec  Theraball heel slides with strap 20 x 5 sec  PT flexion/extension with OP 15 x 5 sec each    Knee extension over bolster 20X  Hip abduction x 20 GT  March walk in // bars with pause in SLS x 5 D/B  Up and down 6\" steps reciprocally X 5    (45 minutes)    CP to the right knee 10 minutes)    OP EDUCATION: HEP     Goals:  Active       PT Problem       PT Goal 1       " Start:  03/24/25    Expected End:  06/22/25       Knee pain 1/10 or less         PT Goal 2       Start:  03/24/25    Expected End:  06/22/25       Improve LEFS by 15%         PT Goal 3       Start:  03/24/25    Expected End:  06/22/25       Normal gait without device  Steps reciprocal with function         PT Goal 4       Start:  03/24/25    Expected End:  06/22/25       0-115 knee ROM          PT Goal 5       Start:  03/24/25    Expected End:  06/22/25       5/5 knee strength

## 2025-04-30 ENCOUNTER — APPOINTMENT (OUTPATIENT)
Dept: DERMATOLOGY | Facility: CLINIC | Age: 65
End: 2025-04-30
Payer: COMMERCIAL

## 2025-04-30 DIAGNOSIS — Z12.83 SCREENING EXAM FOR SKIN CANCER: ICD-10-CM

## 2025-04-30 DIAGNOSIS — D22.9 MULTIPLE BENIGN NEVI: ICD-10-CM

## 2025-04-30 DIAGNOSIS — L57.0 ACTINIC KERATOSIS: Primary | ICD-10-CM

## 2025-04-30 DIAGNOSIS — L82.1 SEBORRHEIC KERATOSIS: ICD-10-CM

## 2025-04-30 DIAGNOSIS — L30.9 HAND DERMATITIS: ICD-10-CM

## 2025-04-30 DIAGNOSIS — L72.11 PILAR CYST: ICD-10-CM

## 2025-04-30 DIAGNOSIS — L81.4 LENTIGO: ICD-10-CM

## 2025-04-30 ASSESSMENT — DERMATOLOGY PATIENT ASSESSMENT
DO YOU HAVE IRREGULAR MENSTRUAL CYCLES: NO
ARE YOU ON BIRTH CONTROL: NO
DO YOU HAVE ANY NEW OR CHANGING LESIONS: YES
ARE YOU AN ORGAN TRANSPLANT RECIPIENT: NO
WHERE ARE THESE NEW OR CHANGING LESIONS LOCATED: RT NECK
ARE YOU TRYING TO GET PREGNANT: NO
HAVE YOU HAD OR DO YOU HAVE A STAPH INFECTION: NO
DO YOU USE SUNSCREEN: DAILY

## 2025-04-30 ASSESSMENT — PATIENT GLOBAL ASSESSMENT (PGA): PATIENT GLOBAL ASSESSMENT: PATIENT GLOBAL ASSESSMENT:  1 - CLEAR

## 2025-04-30 ASSESSMENT — DERMATOLOGY QUALITY OF LIFE (QOL) ASSESSMENT
ARE THERE EXCLUSIONS OR EXCEPTIONS FOR THE QUALITY OF LIFE ASSESSMENT: NO
RATE HOW EMOTIONALLY BOTHERED YOU ARE BY YOUR SKIN PROBLEM (FOR EXAMPLE, WORRY, EMBARRASSMENT, FRUSTRATION): 0 - NEVER BOTHERED
RATE HOW BOTHERED YOU ARE BY SYMPTOMS OF YOUR SKIN PROBLEM (EG, ITCHING, STINGING BURNING, HURTING OR SKIN IRRITATION): 0 - NEVER BOTHERED
DATE THE QUALITY-OF-LIFE ASSESSMENT WAS COMPLETED: 67325
RATE HOW BOTHERED YOU ARE BY EFFECTS OF YOUR SKIN PROBLEMS ON YOUR ACTIVITIES (EG, GOING OUT, ACCOMPLISHING WHAT YOU WANT, WORK ACTIVITIES OR YOUR RELATIONSHIPS WITH OTHERS): 0 - NEVER BOTHERED

## 2025-04-30 ASSESSMENT — ITCH NUMERIC RATING SCALE: HOW SEVERE IS YOUR ITCHING?: 0

## 2025-04-30 NOTE — Clinical Note
-Discussed nature of condition  -Discussed gentle skin care habits including using gentle soap such as Dove or Cetaphil to cleanse the skin.   -Recommend to avoid harsh cleansers/soaps such as: Dial, Lever 2000, [place].  -Recommend to use emollients twice daily, once immediately after the shower while the skin is still damp.   -Recommended emollients include: Aveeno Eczema Therapy or Daily Moisturizer, La Roche Posay Lipikar AP Balm, or plain Vaseline.   -Recommend to avoid hot water/showers; lukewarm or cool water/showers will be beneficial.

## 2025-04-30 NOTE — Clinical Note
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 While working, she washed her hands \"many\" times per day.   -Previous rx for clobetasol was too expensive.   -Continue triamcinolone 0.1% cream BID x 2 weeks for flares. Then, taper to 3x/week.   -Advised patient to apply moisturizer every time after washing her hands. Samples of Neutrogena norwegian hand cream provided.       "

## 2025-04-30 NOTE — Clinical Note
Actinic Keratoses x 4   -Discussed the precancerous nature of these lesions  -Recommend cryotherapy for treatment.   -Reviewed the risks and benefits of treatment with cryotherapy including pain, swelling, erythema, and dyspigmentation.   -After discussion, patient elects to proceed with cryotherapy.

## 2025-04-30 NOTE — Clinical Note
-Discussed the nature of the diagnosis    -Photographed lesion on right posterior lower leg. Favor seborrheic keratosis. However, will schedule 6-month follow up to recheck to ensure lesion is not changing.

## 2025-04-30 NOTE — PROGRESS NOTES
Subjective     Mary Kate Short is a 64 y.o. female who presents for the following: Skin Check (Rt neck).     Patient last seen 3/21/24. No personal history of skin cancer, but family history of melanoma in her father. She returns today for FBSE. Patient has a lesion of concern on her neck. She states the lesion has been changing in color.     Review of Systems:  No other skin or systemic complaints other than what is documented elsewhere in the note.    The following portions of the chart were reviewed this encounter and updated as appropriate:  Tobacco  Allergies  Meds  Problems  Med Hx  Surg Hx         Skin Cancer History  Biopsy Log Book  No skin cancers from Specimen Tracking.    Additional History      Specialty Problems          Dermatology Problems    Contusion of right forearm        Objective   Well appearing patient in no apparent distress; mood and affect are within normal limits.    A full examination was performed including scalp, head, eyes, ears, nose, lips, neck, chest, axillae, abdomen, back, buttocks, bilateral upper extremities, bilateral lower extremities, hands, feet, fingers, toes, fingernails, and toenails. All findings within normal limits unless otherwise noted below.    Assessment/Plan   Skin Exam  1. ACTINIC KERATOSIS (4)  Left Dorsal Hand (3), Right Dorsal Hand  On the bilateral dorsal hands there are 4 erythematous macules with gritty scale.  Actinic Keratoses x 4   -Discussed the precancerous nature of these lesions  -Recommend cryotherapy for treatment.   -Reviewed the risks and benefits of treatment with cryotherapy including pain, swelling, erythema, and dyspigmentation.   -After discussion, patient elects to proceed with cryotherapy.   Destr of lesion - Left Dorsal Hand (3), Right Dorsal Hand  Complexity: simple    Destruction method: cryotherapy    Informed consent: discussed and consent obtained    Lesion destroyed using liquid nitrogen: Yes    Region frozen until ice ball  "extended beyond lesion: Yes    Cryotherapy cycles:  1  Outcome: patient tolerated procedure well with no complications    Post-procedure details: wound care instructions given    2. MULTIPLE BENIGN NEVI  Generalized  Brown and tan macules and papules with reassuring findings on dermoscopy  -These lesions have benign, reassuring patterns on dermoscopy  -Recommend continued self observation, and to contact the office if any changes in nevi are noticed  3. LENTIGO  Generalized  Tan macules  -Benign appearing on exam  -Reassurance, recommend observation  4. SEBORRHEIC KERATOSIS (2)  Generalized, Right Lower Leg - Posterior  Stuck on, waxy macule(s)/papule(s)/plaque(s) with comedo-like openings and milia like cysts    On the right posterior lower leg, there is a brown asymmetric irregularly pigmented macule.     -Discussed the nature of the diagnosis    -Photographed lesion on right posterior lower leg. Favor seborrheic keratosis. However, will schedule 6-month follow up to recheck to ensure lesion is not changing.   Related Procedures  Follow Up In Dermatology - Established Patient  5. PILAR CYST  Mid Parietal Scalp  1.5 cm mobile subcutaneous nodule  -Discussed benign nature and that no treatment is necessary unless it becomes painful or increases in size. Patient opts for clinical monitoring at this time.    6. HAND DERMATITIS  Right Hand - Posterior  On the right thumb, there is a pink atrophic plaque.   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 While working, she washed her hands \"many\" times per day.   -Previous rx for clobetasol was too expensive.   -Continue triamcinolone 0.1% cream BID x 2 weeks for flares. Then, taper to 3x/week.   -Advised patient to apply moisturizer every time after washing her hands. Samples of Neutrogena norwegian hand cream provided.       Related Medications  triamcinolone (Kenalog) 0.1 % cream  Apply topically 2 times a day. To areas of " eczema and itchy spots on body for up to 2 weeks  7. SCREENING EXAM FOR SKIN CANCER  Generalized  Full body skin exam  -No lesions concerning for malignancy on the remainder the skin exam today   - The ugly duckling sign was discussed. Monitor for any skin lesions that are different in color, shape, or size than others on body  -Sun protection was discussed. Recommend SPF 30+, hats with brims, sun protective clothing, and avoiding sun exposure between 10 AM and 2 PM whenever possible  -Recommend regular skin exams or sooner if new or changing lesions     Related Procedures  Follow Up In Dermatology - Established Patient    RTC in 6 months for spot check of right posterior lower leg and 1 year for FBSE.     Concepción Lowry,      I saw and evaluated the patient. I personally obtained the key and critical portions of the history and physical exam or was physically present for key and critical portions performed by the resident/fellow. I reviewed the resident/fellow's documentation and discussed the patient with the resident/fellow. I agree with the resident/fellow's medical decision making as documented in the note and made changes where appropriate.    Ammy Gonzales MD

## 2025-04-30 NOTE — Clinical Note
Stuck on, waxy macule(s)/papule(s)/plaque(s) with comedo-like openings and milia like cysts    On the right posterior lower leg, there is a brown asymmetric irregularly pigmented macule.

## 2025-04-30 NOTE — Clinical Note
-Discussed benign nature and that no treatment is necessary unless it becomes painful or increases in size. Patient opts for clinical monitoring at this time.

## 2025-05-01 ENCOUNTER — TREATMENT (OUTPATIENT)
Dept: PHYSICAL THERAPY | Facility: CLINIC | Age: 65
End: 2025-05-01
Payer: MEDICARE

## 2025-05-01 DIAGNOSIS — M17.11 UNILATERAL PRIMARY OSTEOARTHRITIS, RIGHT KNEE: ICD-10-CM

## 2025-05-01 PROCEDURE — 97110 THERAPEUTIC EXERCISES: CPT | Mod: GP | Performed by: PHYSICAL THERAPIST

## 2025-05-01 NOTE — PROGRESS NOTES
"Physical Therapy    Physical Therapy Treatment    Patient Name: Mary Kate Short  MRN: [id number]  Today's Date: 5/1/2025  Time Entry:   Time Calculation  Start Time: 0140  Stop Time: 0240  Time Calculation (min): 60 min     PT Therapeutic Procedures Time Entry  Therapeutic Exercise Time Entry: 40  PT Modalities Time Entry  Hot/Cold Pack Time Entry: 10  Visit: 9  Insurance: Reviewed  Physician: Jeannie Reich PA-C    Assessment:  Patient continues to have higher pain levels to 5/10.  Encouraged patient to ice the knee frequently at home.        Plan:  Reassess next visit  Continue with HEP    OP PT Plan  PT Plan: Skilled PT  PT Frequency: 2 times per week  Duration: 6  Onset Date: 03/01/25  Certification Period Start Date: 03/24/25  Certification Period End Date: 06/22/25  Rehab Potential: Good  Plan of Care Agreement: Patient    Current Problem  1. Unilateral primary osteoarthritis, right knee  Follow Up In Physical Therapy        General     Subjective   Pain in the morning 4-5/10 at worst last 2 days   Using CP more       Precautions: TKA precautions. HTN     Objective      Treatments:  SciFit stepper x 10 minutes   Step stretch 20X  Step calf stretch 2 x 30 seconds  Step calf raise x 20  Leg press #6  2 X 20  Knee extensions 15# x 20  Seated knee extension/hamstring stretch with OP 5 x 30 sec  Theraball heel slides with strap 20 x 5 sec  PT flexion/extension with OP 15 x 5 sec each  Steamboats on blue foam 10X ech     Not today  Knee extension over bolster 20X  Hip abduction x 20 GT  March walk in // bars with pause in SLS x 5 D/B  Up and down 6\" steps reciprocally X 5 -    (50 minutes)    CP to the right knee 10 minutes)    OP EDUCATION: HEP     Goals:  Active       PT Problem       PT Goal 1       Start:  03/24/25    Expected End:  06/22/25       Knee pain 1/10 or less         PT Goal 2       Start:  03/24/25    Expected End:  06/22/25       Improve LEFS by 15%         PT Goal 3       Start:  03/24/25    " Expected End:  06/22/25       Normal gait without device  Steps reciprocal with function         PT Goal 4       Start:  03/24/25    Expected End:  06/22/25       0-115 knee ROM          PT Goal 5       Start:  03/24/25    Expected End:  06/22/25 5/5 knee strength

## 2025-05-06 ENCOUNTER — TREATMENT (OUTPATIENT)
Dept: PHYSICAL THERAPY | Facility: CLINIC | Age: 65
End: 2025-05-06
Payer: MEDICARE

## 2025-05-06 DIAGNOSIS — M17.11 UNILATERAL PRIMARY OSTEOARTHRITIS, RIGHT KNEE: ICD-10-CM

## 2025-05-06 PROCEDURE — 97110 THERAPEUTIC EXERCISES: CPT | Mod: GP | Performed by: PHYSICAL THERAPIST

## 2025-05-06 NOTE — PROGRESS NOTES
"Physical Therapy    Physical Therapy Treatment    Patient Name: Mary Kate Short  MRN: [id number]  Today's Date: 5/6/2025  Time Entry:   Time Calculation  Start Time: 0145  Stop Time: 0235  Time Calculation (min): 50 min     PT Therapeutic Procedures Time Entry  Therapeutic Exercise Time Entry: 40  PT Modalities Time Entry  Hot/Cold Pack Time Entry: 10  Visit: 9  Insurance: Reviewed  Physician: Jeannie Reich PA-C    Assessment:  Patient continues to have pain levels to 4/10 at worst last 2 days.  Patient advised to continue using ice more frequently for pain relief.        Plan:  Reassess next visit  Continue with HEP    OP PT Plan  PT Plan: Skilled PT  PT Frequency: 2 times per week  Duration: 6  Onset Date: 03/01/25  Certification Period Start Date: 03/24/25  Certification Period End Date: 06/22/25  Rehab Potential: Good  Plan of Care Agreement: Patient    Current Problem  1. Unilateral primary osteoarthritis, right knee  Follow Up In Physical Therapy        General     Subjective   Pain in the morning 4-5/10 at worst last 2 days   Using CP more       Precautions: TKA precautions. HTN     Objective      Treatments:  SciFit stepper x 10 minutes   Step stretch 20X  Step calf stretch 2 x 30 seconds  Step calf raise x 20  Leg press #6  2 X 20  Knee extensions 15# x 20  Seated knee extension/hamstring stretch with OP 5 x 30 sec  Theraball heel slides with strap 20 x 5 sec  PT flexion/extension with OP 15 x 5 sec each  SLS on blue foam 10X ech     Not today  Knee extension over bolster 20X  Hip abduction x 20 GT  March walk in // bars with pause in SLS x 5 D/B  Up and down 6\" steps reciprocally X 5 -    (40 minutes)    CP to the right knee 10 minutes)    OP EDUCATION: HEP     Goals:        "

## 2025-05-08 ENCOUNTER — TREATMENT (OUTPATIENT)
Dept: PHYSICAL THERAPY | Facility: CLINIC | Age: 65
End: 2025-05-08
Payer: MEDICARE

## 2025-05-08 DIAGNOSIS — M17.11 UNILATERAL PRIMARY OSTEOARTHRITIS, RIGHT KNEE: ICD-10-CM

## 2025-05-08 PROCEDURE — 97110 THERAPEUTIC EXERCISES: CPT | Mod: GP | Performed by: PHYSICAL THERAPIST

## 2025-05-08 NOTE — PROGRESS NOTES
"Physical Therapy    Physical Therapy Treatment    Patient Name: Mary Kate Short  MRN: [id number]  Today's Date: 5/8/2025  Time Entry:   Time Calculation  Start Time: 0145  Stop Time: 0235  Time Calculation (min): 50 min     PT Therapeutic Procedures Time Entry  Therapeutic Exercise Time Entry: 40  PT Modalities Time Entry  Hot/Cold Pack Time Entry: 10  Visit: 10  Insurance: Reviewed  Physician: Jeannie Reich PA-C    Assessment:   Patient seen in PT for 10 visits for right knee pain following TKA.  Patient has met all PT goals except pain goal to 2/10, knee ROM 0-110 and hamstring strength to 4/5,  Continue PT 1w4 or until goal is met.        Plan:  Continue with HEP    OP PT Plan  PT Plan: Skilled PT  PT Frequency: 2 times per week  Duration: 6  Onset Date: 03/01/25  Certification Period Start Date: 03/24/25  Certification Period End Date: 06/22/25  Rehab Potential: Good  Plan of Care Agreement: Patient    Current Problem  1. Unilateral primary osteoarthritis, right knee  Follow Up In Physical Therapy        General     Subjective   Pain in the morning 2/10 at worst last 2 days   Using CP   Able to walk 1 mile   Doing yard work, able to cut lawn      Precautions: TKA precautions. HTN     Objective     LEFS = 85% (was 63.75% at initial evaluation)  Normal gait  Steps reciprocally with good form  0-110 right knee ROM  5/5 quad strength, 4/5 hamstring strength      Treatments:  SciFit stepper x 10 minutes   Step stretch 20X  Step calf stretch 2 x 30 seconds  Step calf raise x 20  Step up 6\" 20X  Step up 8\" 10X   Leg press #6  2 X 20  Knee extensions 15# x 20  Seated knee extension/hamstring stretch with OP 5 x 30 sec  Theraball heel slides with strap 20 x 5 sec  PT flexion/extension with OP 15 x 5 sec each  SLS on blue foam 10X ech   (40 minutes)    Not today  Knee extension over bolster 20X  Hip abduction x 20 GT  March walk in // bars with pause in SLS x 5 D/B  Up and down 6\" steps reciprocally X 5 -        CP to the " right knee 10 minutes)    OP EDUCATION: HEP     Goals:  Active       PT Problem       PT Goal 1       Start:  03/24/25    Expected End:  06/22/25       Knee pain 1/10 or less         PT Goal 2       Start:  03/24/25    Expected End:  06/22/25       Improve LEFS by 15%         PT Goal 3       Start:  03/24/25    Expected End:  06/22/25       Normal gait without device  Steps reciprocal with function         PT Goal 4       Start:  03/24/25    Expected End:  06/22/25       0-115 knee ROM          PT Goal 5       Start:  03/24/25    Expected End:  06/22/25       5/5 knee strength

## 2025-05-12 ENCOUNTER — TREATMENT (OUTPATIENT)
Dept: PHYSICAL THERAPY | Facility: CLINIC | Age: 65
End: 2025-05-12
Payer: MEDICARE

## 2025-05-12 DIAGNOSIS — M17.11 UNILATERAL PRIMARY OSTEOARTHRITIS, RIGHT KNEE: ICD-10-CM

## 2025-05-12 PROCEDURE — 97110 THERAPEUTIC EXERCISES: CPT | Mod: GP | Performed by: PHYSICAL THERAPIST

## 2025-05-12 NOTE — PROGRESS NOTES
"Physical Therapy    Physical Therapy Treatment    Patient Name: Mary Kate Short  MRN: [id number]  Today's Date: 5/12/2025  Time Entry:   Time Calculation  Start Time: 0755  Stop Time: 0850  Time Calculation (min): 55 min     PT Therapeutic Procedures Time Entry  Therapeutic Exercise Time Entry: 40  PT Modalities Time Entry  Hot/Cold Pack Time Entry: 10  Visit: 11  Insurance: Reviewed  Physician: Jeannie Reich PA-C    Assessment:   Patient advised to do heel slide with strap 1-2X a day with HEP to be more aggressive with knee ROM with home program.        Plan:  Continue with HEP    OP PT Plan  PT Plan: Skilled PT  PT Frequency: 2 times per week  Duration: 6  Onset Date: 03/01/25  Certification Period Start Date: 03/24/25  Certification Period End Date: 06/22/25  Rehab Potential: Good  Plan of Care Agreement: Patient    Current Problem  1. Unilateral primary osteoarthritis, right knee  Follow Up In Physical Therapy        General     Subjective   2/10 at worst last 2 days         Precautions: TKA precautions. HTN     Objective          Treatments:  SciFit stepper x 10 minutes   Step stretch 20X  Heel raises 5# 20X   Mini squats 5# 20X  Step up 8\" 10X   Leg press #6  2 X 20 - not today  Knee extensions 15# x 20  Heel slide with strap 2 X 10  Theraball heel slides with strap 2 X 20  PT flexion/extension with OP 15 x 5 sec each  SLS on blue foam 10X each   (45 minutes)    Not today  Knee extension over bolster 20X  Hip abduction x 20 GT  March walk in // bars with pause in SLS x 5 D/B  Up and down 6\" steps reciprocally X 5 -        CP to the right knee 10 minutes)    OP EDUCATION: HEP     Goals:  Active       PT Problem       PT Goal 1       Start:  03/24/25    Expected End:  06/22/25       Knee pain 1/10 or less         PT Goal 2       Start:  03/24/25    Expected End:  06/22/25       Improve LEFS by 15%         PT Goal 3       Start:  03/24/25    Expected End:  06/22/25       Normal gait without device  Steps " reciprocal with function         PT Goal 4       Start:  03/24/25    Expected End:  06/22/25       0-115 knee ROM          PT Goal 5       Start:  03/24/25    Expected End:  06/22/25 5/5 knee strength

## 2025-05-22 ENCOUNTER — OFFICE VISIT (OUTPATIENT)
Dept: ORTHOPEDIC SURGERY | Facility: CLINIC | Age: 65
End: 2025-05-22
Payer: MEDICARE

## 2025-05-22 ENCOUNTER — APPOINTMENT (OUTPATIENT)
Dept: PHYSICAL THERAPY | Facility: CLINIC | Age: 65
End: 2025-05-22
Payer: MEDICARE

## 2025-05-22 ENCOUNTER — HOSPITAL ENCOUNTER (OUTPATIENT)
Dept: RADIOLOGY | Facility: CLINIC | Age: 65
Discharge: HOME | End: 2025-05-22
Payer: MEDICARE

## 2025-05-22 DIAGNOSIS — Z96.651 TOTAL KNEE REPLACEMENT STATUS, RIGHT: ICD-10-CM

## 2025-05-22 PROCEDURE — 73562 X-RAY EXAM OF KNEE 3: CPT | Mod: RT

## 2025-05-22 PROCEDURE — 99211 OFF/OP EST MAY X REQ PHY/QHP: CPT | Performed by: PHYSICIAN ASSISTANT

## 2025-05-22 PROCEDURE — 73562 X-RAY EXAM OF KNEE 3: CPT | Mod: RIGHT SIDE | Performed by: RADIOLOGY

## 2025-05-22 NOTE — PROGRESS NOTES
JIGNESH Stauffer, PACarlitoC, ATC  Adult Reconstruction and Joint Replacement Surgery  Phone: [phone]     Fax:[phone]            No chief complaint on file.      HPI:  Mary Kate Short is a pleasant 64 y.o. year-old female here for follow-up of their side: right total knee arthroplasty by Dr. Lim.  She had a right total knee arthroplasty back in March with Dr.Steven Lim.  She did trip and fall a week ago.  She caught her pant leg on the license plate and fell onto that knee.  She does have some pain and swelling on the lateral aspect of the knee.  She denies any instability or mechanical symptoms.  Rates her pain as a 5 out of 10.  She does have about 110 degrees of flexion and 0 extension    RX Allergies[1]  Social History     Socioeconomic History    Marital status:      Spouse name: Not on file    Number of children: Not on file    Years of education: Not on file    Highest education level: Not on file   Occupational History    Not on file   Tobacco Use    Smoking status: Never    Smokeless tobacco: Never   Substance and Sexual Activity    Alcohol use: Yes     Alcohol/week: 1.0 standard drink of alcohol     Types: 1 Glasses of wine per week    Drug use: Never    Sexual activity: Not on file   Other Topics Concern    Not on file   Social History Narrative    Not on file     Social Drivers of Health     Financial Resource Strain: Not on file   Food Insecurity: Not on file   Transportation Needs: No Transportation Needs (3/21/2025)    OASIS : Transportation     Lack of Transportation (Medical): No     Lack of Transportation (Non-Medical): No     Patient Unable or Declines to Respond: No   Physical Activity: Not on file   Stress: Not on file   Social Connections: Feeling Socially Integrated (3/21/2025)    OASIS : Social Isolation     Frequency of experiencing loneliness or isolation: Rarely   Intimate Partner Violence: Not on file   Housing Stability: Not on file      Surgical History[2]    Physical Exam  side: right Knee  There were no vitals filed for this visit.  AxO x 3 in NAD.   Assistive Device: no device. Coordination and balance intact.  Normal bilateral upper and lower extremities.  No erythema, ecchymosis, temperature changes. No popliteal lymphadenopathy,  No overlying lesion  Mood: euthymic  Respirations non-labored  The incision is midline healing well with no signs of surrounding infection, dehiscence or drainage.   Neurovascular exam is at baseline.  No instability varus or valgus stressing the knee at 0, 30 or 60 degrees.  No instability in the AP plane at 90 degrees.  Range of motion: 0 degrees extension, 110 degrees flexion  5/5 hip flexion/knee extension/DF/PF/EHL  SILT in june/saph/ per/tib distribution   Extremities warm and well perfused.  No lower extremity calf tenderness, warmth or swelling. Lower extremity well perfused  2+ Femoral/DP/PT pulses bilaterally  Tenderness to palpation over the right lateral aspect of the joint    Imaging:    I personally reviewed multiple views of the knee today in clinic. Status post side: right Total Knee Arthroplasty. The implant is well fixed, well aligned.  No evidence of lorene-implant fracture, lucency or dislocation.    Impression/Plan:  Mary Kate Short is doing well post-operatively and happy with the results of the operation.     S/P side: right Total Knee Arthroplasty/fall-I discussed with Mary Kate today that her x-rays look great.  I do not think she did anything serious to her knee.  She should continue to ice and take an NSAID of her choice.  She is still doing physical therapy.  All questions answered.    Follow-up as needed    Jeannie Reich MPAS, PA-C, ATC  Orthopedic Physician [employer]  Adult Reconstruction and Total Joint Replacement  General Orthopedics  Department of Orthopaedic Surgery  [place] [id number]  [employer]  preferred         [1]   Allergies  Allergen Reactions    Penicillins Rash   [2]   Past Surgical History:  Procedure Laterality Date    OTHER SURGICAL HISTORY  10/14/2020    Oophorectomy bilateral    OTHER SURGICAL HISTORY  10/14/2020    Hysterectomy    OTHER SURGICAL HISTORY  10/14/2020    Appendectomy

## 2025-06-02 ENCOUNTER — TREATMENT (OUTPATIENT)
Dept: PHYSICAL THERAPY | Facility: CLINIC | Age: 65
End: 2025-06-02
Payer: MEDICARE

## 2025-06-02 DIAGNOSIS — M17.11 UNILATERAL PRIMARY OSTEOARTHRITIS, RIGHT KNEE: ICD-10-CM

## 2025-06-02 PROCEDURE — 97110 THERAPEUTIC EXERCISES: CPT | Mod: GP,CQ

## 2025-06-02 NOTE — PROGRESS NOTES
"Physical Therapy    Physical Therapy Treatment    Patient Name: Mary Kate Short  MRN: [id number]  Today's Date: 6/2/2025  Time Entry:   Time Calculation  Start Time: 0930  Stop Time: 1015  Time Calculation (min): 45 min     PT Therapeutic Procedures Time Entry  Therapeutic Exercise Time Entry: 45     Visit: 12  Insurance: Reviewed  Physician: Jeannie Reich PA-C    Assessment:  Patient tolerated session well with normal tolerable pains with end ranges of flexion/extension; no complications from recent fall. Reviewed HEP and increasing extension stretching efforts to improve ROM. Measured flexion today at 110 degrees.     Plan:  Continue with HEP    OP PT Plan  PT Plan: Skilled PT  PT Frequency: 2 times per week  Duration: 6  Onset Date: 03/01/25  Certification Period Start Date: 03/24/25  Certification Period End Date: 06/22/25  Rehab Potential: Good  Plan of Care Agreement: Patient    Current Problem  1. Unilateral primary osteoarthritis, right knee  Follow Up In Physical Therapy        General     Subjective   After my last therapy visit I fell, my pants got caught on my car license plate  I saw ortho and the knee replacement is okay, although I chipped the bone on the lateral side  It has been swollen but I have been icing it and maintaining my HEP    Precautions: TKA precautions. HTN     Objective      Treatments:  SciFit stepper x 10 minutes   Step stretch 20X  Heel raises 5# 20X   Mini squats 5# 20X  Step up 8\" 10X   Leg press #6  2 X 20 - not today  Knee extensions 15# x 20  Knee curls 20# x 20  Lateral walking GTB x 5  Heel slide with strap 2 X 10  PT flexion/extension with OP 15 x 5 sec each  SLS on blue foam 10X each     (45 minutes)    Not today  Knee extension over bolster 20X  Hip abduction x 20 GT  March walk in // bars with pause in SLS x 5 D/B  Up and down 6\" steps reciprocally X 5 -    CP to the right knee 10 minutes)    OP EDUCATION: HEP     Goals:  Active       PT Problem       PT Goal 1       " Start:  03/24/25    Expected End:  06/22/25       Knee pain 1/10 or less         PT Goal 2       Start:  03/24/25    Expected End:  06/22/25       Improve LEFS by 15%         PT Goal 3       Start:  03/24/25    Expected End:  06/22/25       Normal gait without device  Steps reciprocal with function         PT Goal 4       Start:  03/24/25    Expected End:  06/22/25       0-115 knee ROM          PT Goal 5       Start:  03/24/25    Expected End:  06/22/25       5/5 knee strength

## 2025-06-03 ENCOUNTER — TELEPHONE (OUTPATIENT)
Dept: PRIMARY CARE | Facility: CLINIC | Age: 65
End: 2025-06-03
Payer: COMMERCIAL

## 2025-06-03 ENCOUNTER — OFFICE VISIT (OUTPATIENT)
Dept: URGENT CARE | Age: 65
End: 2025-06-03
Payer: MEDICARE

## 2025-06-03 VITALS
RESPIRATION RATE: 15 BRPM | SYSTOLIC BLOOD PRESSURE: 150 MMHG | DIASTOLIC BLOOD PRESSURE: 86 MMHG | HEART RATE: 69 BPM | OXYGEN SATURATION: 96 % | TEMPERATURE: 97.4 F

## 2025-06-03 DIAGNOSIS — J01.90 ACUTE SINUSITIS, RECURRENCE NOT SPECIFIED, UNSPECIFIED LOCATION: Primary | ICD-10-CM

## 2025-06-03 DIAGNOSIS — H65.191 ACUTE EFFUSION OF RIGHT EAR: ICD-10-CM

## 2025-06-03 RX ORDER — DOXYCYCLINE 100 MG/1
100 CAPSULE ORAL 2 TIMES DAILY
Qty: 20 CAPSULE | Refills: 0 | Status: SHIPPED | OUTPATIENT
Start: 2025-06-03 | End: 2025-06-13

## 2025-06-03 ASSESSMENT — ENCOUNTER SYMPTOMS
LIGHT-HEADEDNESS: 0
CHILLS: 0
SHORTNESS OF BREATH: 0
FEVER: 0
DIARRHEA: 0
CONSTIPATION: 0
PALPITATIONS: 0
WHEEZING: 0
SORE THROAT: 0
DIZZINESS: 1
NAUSEA: 0
COUGH: 0
CHEST TIGHTNESS: 0
HEADACHES: 0
TROUBLE SWALLOWING: 0
RHINORRHEA: 1

## 2025-06-03 ASSESSMENT — PAIN SCALES - GENERAL: PAINLEVEL_OUTOF10: 0-NO PAIN

## 2025-06-03 NOTE — PROGRESS NOTES
Subjective   Patient ID: Mary Kate Short is a 64 y.o. female. They present today with a chief complaint of Dizziness (When she got off bed she felt lightheaded for a couple days and a sour smell in her nose for a month, no pain).    History of Present Illness  Patient presents with dizziness with positional changes like getting up from bed or sitting up from a chair over the last few days. She explains that over the last month or 2 she has had progressing congestion and a poor taste and smell coming from nose and drainage. Denies fever. Denies n/v/d. Denies chest pain, sob. Denies palpitations or any generalized weakness. She explains that she has had some sweats that she attributes to menopause. Denies cough.       Dizziness  Associated symptoms: no chest pain, no diarrhea, no headaches, no nausea, no palpitations and no shortness of breath        Past Medical History  Allergies as of 06/03/2025 - Reviewed 06/03/2025   Allergen Reaction Noted    Penicillins Rash 03/16/2023       Prescriptions Prior to Admission[1]     Medical History[2]    Surgical History[3]     reports that she has never smoked. She has never used smokeless tobacco. She reports current alcohol use of about 1.0 standard drink of alcohol per week. She reports that she does not use drugs.    Review of Systems  Review of Systems   Constitutional:  Negative for chills and fever.   HENT:  Positive for congestion and rhinorrhea. Negative for ear pain, postnasal drip, sore throat and trouble swallowing.    Respiratory:  Negative for cough, chest tightness, shortness of breath and wheezing.    Cardiovascular:  Negative for chest pain and palpitations.   Gastrointestinal:  Negative for constipation, diarrhea and nausea.   Skin:  Negative for rash.   Neurological:  Positive for dizziness. Negative for light-headedness and headaches.                                  Objective    Vitals:    06/03/25 1641   BP: 150/86   BP Location: Left arm   Patient  Position: Sitting   Pulse: 69   Resp: 15   Temp: 36.3 °C (97.4 °F)   TempSrc: Oral   SpO2: 96%     No LMP recorded (lmp unknown). Patient has had a hysterectomy.    Physical Exam  Constitutional:       General: She is not in acute distress.     Appearance: She is not toxic-appearing.   HENT:      Right Ear: External ear normal. A middle ear effusion is present.      Left Ear: Tympanic membrane and external ear normal.      Nose: No congestion.      Right Sinus: No frontal sinus tenderness.      Left Sinus: No frontal sinus tenderness.      Mouth/Throat:      Mouth: Mucous membranes are moist. No oral lesions.      Pharynx: Postnasal drip present. No oropharyngeal exudate or posterior oropharyngeal erythema.   Eyes:      Pupils: Pupils are equal, round, and reactive to light.   Cardiovascular:      Rate and Rhythm: Normal rate and regular rhythm.   Pulmonary:      Effort: Pulmonary effort is normal. No respiratory distress.      Breath sounds: Normal breath sounds. No wheezing.   Musculoskeletal:      Cervical back: Normal range of motion.   Skin:     Findings: No rash.   Neurological:      General: No focal deficit present.      Mental Status: She is alert and oriented to person, place, and time.      Gait: Gait normal.   Psychiatric:         Mood and Affect: Mood normal.         Behavior: Behavior normal.         Procedures    Point of Care Test & Imaging Results from this visit  No results found for this visit on 06/03/25.   Imaging  No results found.    Cardiology, Vascular, and Other Imaging  No other imaging results found for the past 2 days      Diagnostic study results (if any) were reviewed by Jenn Christianson PA-C.    Assessment/Plan   Allergies, medications, history, and pertinent labs/EKGs/Imaging reviewed by Jenn Christianson PA-C.     Medical Decision Making  MDM- History and examination consistent with suspected acute sinusitis and right sided middle ear effusion. No indication for labs or imaging  at this time. No evidence of sepsis, strep pharyngitis, or pneumonia. No focal neurological deficits. Counseled patient/family on antibiotic treatment and supportive measures at home. Patient advised to return to clinic or present to ED if symptoms change or worsen. Otherwise follow-up with PCP. Patient verbalized understanding and agrees with plan.       Orders and Diagnoses  Diagnoses and all orders for this visit:  Acute sinusitis, recurrence not specified, unspecified location  -     doxycycline (Vibramycin) 100 mg capsule; Take 1 capsule (100 mg) by mouth 2 times a day for 10 days. Take with at least 8 ounces (large glass) of water, do not lie down for 30 minutes after  Acute effusion of right ear      Medical Admin Record      Patient disposition: Home    Electronically signed by Jenn Christianson PA-C  4:55 PM           [1] (Not in a hospital admission)   [2]   Past Medical History:  Diagnosis Date    Cat bite 09/27/2024    Hyperlipidemia     Hypertension     Hypothyroidism     Nephrolithiasis     PONV (postoperative nausea and vomiting)     Sleep apnea    [3]   Past Surgical History:  Procedure Laterality Date    OTHER SURGICAL HISTORY  10/14/2020    Oophorectomy bilateral    OTHER SURGICAL HISTORY  10/14/2020    Hysterectomy    OTHER SURGICAL HISTORY  10/14/2020    Appendectomy

## 2025-06-03 NOTE — PATIENT INSTRUCTIONS
Recommended Salt water gargles, hot tea and honey, tylenol/ibuprofen, nasal sprays, steam inhalation    Take antibiotic with at least 8 oz of water.     Medications over the counter to try to facilitate movement of fluid behind ear drum include Flonase nasal spray, antihistamines like zyrtec or Claritin.     Recommended probiotics, yogurt while on antibiotic.     Follow up with pcp in the next 2 weeks.     If you develop any high fever, shortness of breath, chest pain, visual changes, vomiting, palpitations go directly to ER

## 2025-06-03 NOTE — TELEPHONE ENCOUNTER
Patient sent you a [place] message yesterday she's been having lightheadness and dizziness with blurred vision she's calling today for the same reason france advised her to go to the ER

## 2025-06-03 NOTE — TELEPHONE ENCOUNTER
I called patient and discussed. She went to the Urgent Care today for dizziness and lightheadedness. Was dx with sinus infection and acute effusion of her ear and was started on doxycycline. I would like to see her for a follow up visit. It looks like my first available is Tuesday 6/10/25 at 1:40PM if you could please add her to my schedule at this day and time and let her know. Thank you.

## 2025-06-09 ENCOUNTER — OFFICE VISIT (OUTPATIENT)
Dept: PRIMARY CARE | Facility: CLINIC | Age: 65
End: 2025-06-09
Payer: MEDICARE

## 2025-06-09 VITALS
BODY MASS INDEX: 28.51 KG/M2 | SYSTOLIC BLOOD PRESSURE: 128 MMHG | DIASTOLIC BLOOD PRESSURE: 74 MMHG | OXYGEN SATURATION: 97 % | WEIGHT: 167 LBS | HEART RATE: 74 BPM | TEMPERATURE: 97.4 F | HEIGHT: 64 IN

## 2025-06-09 DIAGNOSIS — H81.11 VERTIGO, BENIGN PAROXYSMAL, RIGHT: Primary | ICD-10-CM

## 2025-06-09 DIAGNOSIS — H65.191 ACUTE EFFUSION OF RIGHT EAR: ICD-10-CM

## 2025-06-09 PROCEDURE — 1159F MED LIST DOCD IN RCRD: CPT

## 2025-06-09 PROCEDURE — 99213 OFFICE O/P EST LOW 20 MIN: CPT

## 2025-06-09 PROCEDURE — 3078F DIAST BP <80 MM HG: CPT

## 2025-06-09 PROCEDURE — 1036F TOBACCO NON-USER: CPT

## 2025-06-09 PROCEDURE — 1160F RVW MEDS BY RX/DR IN RCRD: CPT

## 2025-06-09 PROCEDURE — 3008F BODY MASS INDEX DOCD: CPT

## 2025-06-09 PROCEDURE — 3074F SYST BP LT 130 MM HG: CPT

## 2025-06-09 PROCEDURE — G2211 COMPLEX E/M VISIT ADD ON: HCPCS

## 2025-06-09 RX ORDER — MECLIZINE HCL 12.5 MG 12.5 MG/1
12.5 TABLET ORAL 2 TIMES DAILY PRN
Qty: 30 TABLET | Refills: 0 | Status: SHIPPED | OUTPATIENT
Start: 2025-06-09 | End: 2025-07-09

## 2025-06-09 NOTE — PROGRESS NOTES
"Primary Care Provider: Leisa Claudio, APRN-CNP    Subjective   Mary Kate Short is a 64 y.o. female who presents for follow up.    HPI   FUV    Feeling lightheaded for 1 week now  Worse with rolling over in bed or moving her head  Went to urgent care 6/3 and was started on doxycycline, was told she had acute sinusitis and acute effusion of right ear. Her lightheadedness has slightly improved since she started the doxycycline  No chest pain, no SOB, no N/V, no numbness, no tingling, no weakness, no changes in vision, no HA    Review of Systems  The remainder of the ROS was negative unless otherwise stated in the HPI.       Objective   /74 (BP Location: Left arm, Patient Position: Sitting, BP Cuff Size: Adult)   Pulse 74   Temp 36.3 °C (97.4 °F) (Oral)   Ht 1.626 m (5' 4\")   Wt 75.8 kg (167 lb)   LMP  (LMP Unknown)   SpO2 97%   BMI 28.67 kg/m²     Physical Exam  Vitals reviewed.   Constitutional:       General: She is not in acute distress.     Appearance: Normal appearance. She is normal weight. She is not ill-appearing, toxic-appearing or diaphoretic.   HENT:      Head: Normocephalic and atraumatic.      Right Ear: A middle ear effusion is present. There is no impacted cerumen.      Left Ear: Tympanic membrane, ear canal and external ear normal. There is no impacted cerumen.      Nose: Nose normal.   Eyes:      Conjunctiva/sclera: Conjunctivae normal.   Neck:      Vascular: No carotid bruit.   Cardiovascular:      Rate and Rhythm: Normal rate and regular rhythm.      Pulses: Normal pulses.      Heart sounds: Normal heart sounds. No murmur heard.     No friction rub. No gallop.   Pulmonary:      Effort: Pulmonary effort is normal. No respiratory distress.      Breath sounds: Normal breath sounds.   Abdominal:      General: Abdomen is flat. Bowel sounds are normal.      Palpations: Abdomen is soft.   Musculoskeletal:         General: Normal range of motion.      Cervical back: Normal range of motion " and neck supple.   Lymphadenopathy:      Cervical: No cervical adenopathy.   Skin:     General: Skin is warm and dry.      Capillary Refill: Capillary refill takes less than 2 seconds.   Neurological:      General: No focal deficit present.      Mental Status: She is alert and oriented to person, place, and time. Mental status is at baseline.      Cranial Nerves: No cranial nerve deficit.   Psychiatric:         Mood and Affect: Mood normal.         Behavior: Behavior normal.         Thought Content: Thought content normal.         Judgment: Judgment normal.       +positive Custer-Hallpike test RIGHT    Assessment/Plan   Problem List Items Addressed This Visit           ICD-10-CM    Vertigo, benign paroxysmal, right - Primary H81.11    Persisting  +positive Benjie-Hallpike test RIGHT  Relevant Medications    meclizine (Antivert) 12.5 mg tablet    Other Relevant Orders    Referral to Physical Therapy    Acute effusion of right ear H65.191   Improving  Complete doxycycline  Could start Flonase nasal spray once daily each nostril OTC    Patient was identified as a fall risk. Risk prevention instructions provided.   If Red flags signs to the ER or 911      Follow up in 2 weeks or sooner if needed

## 2025-06-12 ENCOUNTER — TREATMENT (OUTPATIENT)
Dept: PHYSICAL THERAPY | Facility: CLINIC | Age: 65
End: 2025-06-12
Payer: MEDICARE

## 2025-06-12 DIAGNOSIS — M17.11 UNILATERAL PRIMARY OSTEOARTHRITIS, RIGHT KNEE: ICD-10-CM

## 2025-06-12 PROCEDURE — 97110 THERAPEUTIC EXERCISES: CPT | Mod: GP | Performed by: PHYSICAL THERAPIST

## 2025-06-12 NOTE — PROGRESS NOTES
"Physical Therapy    Physical Therapy Treatment    Patient Name: Mary Kate Short  MRN: [id number]  Today's Date: 6/12/2025  Time Entry:   Time Calculation  Start Time: 0815  Stop Time: 0905  Time Calculation (min): 50 min     PT Therapeutic Procedures Time Entry  Therapeutic Exercise Time Entry: 40     Visit: 13  Insurance: Reviewed  Physician: Jeannie Reich PA-C    Assessment: Patient with recent fall with increased swelling and temp at right knee.  Has had some episodes of vertigo due to an ear infection - better since taking meds for vertigo and ear infection.  Patient with improved quad strength.  Patient wishes to continue with 4 more PT visits to work on LE strength and balance.        Plan:  Continue with HEP    OP PT Plan  PT Plan: Skilled PT  PT Frequency: 2 times per week  Duration: 6  Onset Date: 03/01/25  Certification Period Start Date: 03/24/25  Certification Period End Date: 06/22/25  Rehab Potential: Good  Plan of Care Agreement: Patient    Current Problem  1. Unilateral primary osteoarthritis, right knee  Follow Up In Physical Therapy        General     Subjective   Knee pain 4/10 at worst last 2 days   After my last therapy visit I fell, my pants got caught on my car license plate  I saw ortho and the knee replacement is okay, although I chipped the bone on the lateral side  It has been swollen but I have been icing it and maintaining my HEP    Precautions: TKA precautions. HTN     Objective      Right knee quad strength 5/5, hamstring 4+/5  Min/mod swelling right knee - increased temp right knee  Normal gait   Steps reciprocal with good form  LEFS=93.75%    Treatments:  SciFit stepper x 10 minutes   Step stretch 20X  Step up 8\" 20X   Leg press #6  2 X 20 - not today  Knee extensions 15# x 20  Heel slide with strap 2 X 10  PT flexion/extension with OP 15 x 5 sec each  SLS on blue foam 10X each     (45 minutes)    Not today  Knee extension over bolster 20X  Hip abduction x 20 GT  March walk in // " "bars with pause in SLS x 5 D/B  Up and down 6\" steps reciprocally X 5 -    CP to the right knee 10 minutes)    OP EDUCATION: HEP     Goals:  Active       PT Problem       PT Goal 1       Start:  03/24/25    Expected End:  06/22/25       Knee pain 1/10 or less         PT Goal 2       Start:  03/24/25    Expected End:  06/22/25       Improve LEFS by 15%         PT Goal 3       Start:  03/24/25    Expected End:  06/22/25       Normal gait without device  Steps reciprocal with function         PT Goal 4       Start:  03/24/25    Expected End:  06/22/25       0-115 knee ROM          PT Goal 5       Start:  03/24/25    Expected End:  06/22/25       5/5 knee strength                  "

## 2025-06-17 ENCOUNTER — APPOINTMENT (OUTPATIENT)
Dept: PRIMARY CARE | Facility: CLINIC | Age: 65
End: 2025-06-17
Payer: COMMERCIAL

## 2025-06-19 ENCOUNTER — TREATMENT (OUTPATIENT)
Dept: PHYSICAL THERAPY | Facility: CLINIC | Age: 65
End: 2025-06-19
Payer: MEDICARE

## 2025-06-19 DIAGNOSIS — M17.11 UNILATERAL PRIMARY OSTEOARTHRITIS, RIGHT KNEE: ICD-10-CM

## 2025-06-19 PROCEDURE — 97110 THERAPEUTIC EXERCISES: CPT | Mod: GP | Performed by: PHYSICAL THERAPIST

## 2025-06-19 NOTE — PROGRESS NOTES
"Physical Therapy    Physical Therapy Treatment    Patient Name: Mary Kate Short  MRN: [id number]  Today's Date: 6/19/2025  Time Entry:   Time Calculation  Start Time: 0140  Stop Time: 0230  Time Calculation (min): 50 min  Visit: 14  Insurance: Reviewed  Physician: Jeannie Reich PA-C    Assessment:  Patient was able to walk around for miles on vacation with some increased soreness and stiffness.  Patient icing due to recent increase in swelling following fall.      Plan:  Continue with HEP    OP PT Plan  PT Plan: Skilled PT  PT Frequency: 2 times per week  Duration: 6  Onset Date: 03/01/25  Certification Period Start Date: 03/24/25  Certification Period End Date: 06/22/25  Rehab Potential: Good  Plan of Care Agreement: Patient    Current Problem  1. Unilateral primary osteoarthritis, right knee  Follow Up In Physical Therapy        General     Subjective   Knee pain 4/10 at worst last 2 days   Able to walk a lot walking on vacation in [place] - able to walk miles   Some soreness after walking - used ice to help with soreness and stiffness    Precautions: TKA precautions. HTN     Objective      Right knee quad strength 5/5, hamstring 4+/5  Min/mod swelling right knee - increased temp right knee  Normal gait   Steps reciprocal with good form  LEFS=93.75%    Treatments:  SciFit stepper x 10 minutes   Step stretch 20X  Side step up 10X   Step up 6\" 20X   Leg press #6  2 X 20   Knee extensions 15# x 20  Heel slide with strap 2 X 20  PT flexion/extension with OP 15 x 5 sec each  SLS on blue foam 10X each     (40 minutes)    Not today  Knee extension over bolster 20X  Hip abduction x 20 GT  March walk in // bars with pause in SLS x 5 D/B  Up and down 6\" steps reciprocally X 5 -    CP to the right knee 10 minutes)    OP EDUCATION: HEP     Goals:  Active       PT Problem       PT Goal 1       Start:  03/24/25    Expected End:  06/22/25       Knee pain 1/10 or less         PT Goal 2       Start:  03/24/25    Expected End:  " 06/22/25       Improve LEFS by 15%         PT Goal 3       Start:  03/24/25    Expected End:  06/22/25       Normal gait without device  Steps reciprocal with function         PT Goal 4       Start:  03/24/25    Expected End:  06/22/25       0-115 knee ROM          PT Goal 5       Start:  03/24/25    Expected End:  06/22/25       5/5 knee strength

## 2025-06-23 ENCOUNTER — APPOINTMENT (OUTPATIENT)
Dept: PRIMARY CARE | Facility: CLINIC | Age: 65
End: 2025-06-23
Payer: COMMERCIAL

## 2025-06-23 DIAGNOSIS — Z98.890 HISTORY OF ARTHROPLASTY: ICD-10-CM

## 2025-06-23 RX ORDER — CLINDAMYCIN HYDROCHLORIDE 300 MG/1
CAPSULE ORAL
Qty: 2 CAPSULE | Refills: 6 | Status: SHIPPED | OUTPATIENT
Start: 2025-06-23

## 2025-06-25 ENCOUNTER — DOCUMENTATION (OUTPATIENT)
Dept: PHYSICAL THERAPY | Facility: CLINIC | Age: 65
End: 2025-06-25
Payer: COMMERCIAL

## 2025-06-25 ENCOUNTER — APPOINTMENT (OUTPATIENT)
Dept: PHYSICAL THERAPY | Facility: CLINIC | Age: 65
End: 2025-06-25
Payer: MEDICARE

## 2025-06-25 NOTE — PROGRESS NOTES
Physical Therapy                 Therapy Communication Note    Patient Name: Mary Kate Short  MRN: [id number]  Department:   Room: Room/bed info not found  Today's Date: 6/25/2025     Discipline: Physical Therapy    Missed Visit:       Missed Visit Reason:  conflicting schedules    Missed Time: Cancel    Comment:

## 2025-07-01 ENCOUNTER — TELEPHONE (OUTPATIENT)
Dept: PRIMARY CARE | Facility: CLINIC | Age: 65
End: 2025-07-01
Payer: COMMERCIAL

## 2025-07-01 NOTE — LETTER
July 1, 2025     Mary Kate Deja    Patient: Mary Kate Short   YOB: 1960   Date of Visit: 7/1/2025       Dear Dr. Mary Kate Short:    Thank you for referring Mary Kate Short to me for evaluation. Below are my notes for this consultation.  If you have questions, please do not hesitate to call me. I look forward to following your patient along with you.       Sincerely,     Leisa Claudio, DONG-CNP      CC: No Recipients  ______________________________________________________________________________________    I called patient and dicussed. She is actually feeling well without the CPAP machine. Is working on weight loss and lifestyle changes. She actually has not had any further vertigo symptoms since she has not been using the CPAP machine.

## 2025-07-01 NOTE — TELEPHONE ENCOUNTER
I called patient and dicussed. She is actually feeling well without the CPAP machine. Is working on weight loss and lifestyle changes. She actually has not had any further vertigo symptoms since she has not been using the CPAP machine.

## 2025-07-02 ENCOUNTER — TREATMENT (OUTPATIENT)
Dept: PHYSICAL THERAPY | Facility: CLINIC | Age: 65
End: 2025-07-02
Payer: MEDICARE

## 2025-07-02 DIAGNOSIS — M17.11 UNILATERAL PRIMARY OSTEOARTHRITIS, RIGHT KNEE: ICD-10-CM

## 2025-07-02 PROCEDURE — 97110 THERAPEUTIC EXERCISES: CPT | Mod: GP | Performed by: PHYSICAL THERAPIST

## 2025-07-02 NOTE — PROGRESS NOTES
"Physical Therapy    Physical Therapy Treatment    Patient Name: Mary Kate Short  MRN: [id number]  Today's Date: 7/2/2025  Time Entry:   Time Calculation  Start Time: 0945  Stop Time: 1035  Time Calculation (min): 50 min  Visit: 15  Insurance: Reviewed  Physician: Jeannie Reich PA-C    Assessment:  Patient continues to have moderate knee pain to 6/10.  Icing and elevating to reduce knee swelling.  Patient also complains of lateral right hip pain.      Plan:  Reassess next visit   Continue with HEP    OP PT Plan  PT Plan: Skilled PT  PT Frequency: 2 times per week  Duration: 6  Onset Date: 03/01/25  Certification Period Start Date: 03/24/25  Certification Period End Date: 06/22/25  Rehab Potential: Good  Plan of Care Agreement: Patient    Current Problem  1. Unilateral primary osteoarthritis, right knee  Follow Up In Physical Therapy        General     Subjective   Knee pain 6/10 at worst last 2 days - sharp  Pain at night, and with sitting too long      Precautions: TKA precautions. HTN     Objective      Right knee quad strength 5/5, hamstring 4+/5  Min/mod swelling right knee - increased temp right knee  Normal gait   Steps reciprocal with good form  LEFS=93.75%    Treatments:  SciFit stepper x 10 minutes   Step stretch 20X  Step up 6\" 20X   Steamboats on blue foam 10X   Leg press #6  2 X 20   Knee extensions 15# x 20  Heel slide with strap 2 X 20  SAQ with 2.5# 20X, and SLR 2.5# 10X   PT flexion/extension with OP 15 x 5 sec each    (40 minutes)    CP to the right knee 10 minutes    Not today  Knee extension over bolster 20X  Hip abduction x 20 GT  March walk in // bars with pause in SLS x 5 D/B  Up and down 6\" steps reciprocally X 5 -    CP to the right knee 10 minutes)    OP EDUCATION: HEP     Goals:                "

## 2025-07-07 ENCOUNTER — APPOINTMENT (OUTPATIENT)
Dept: PHYSICAL THERAPY | Facility: CLINIC | Age: 65
End: 2025-07-07
Payer: MEDICARE

## 2025-07-08 ENCOUNTER — TREATMENT (OUTPATIENT)
Dept: PHYSICAL THERAPY | Facility: CLINIC | Age: 65
End: 2025-07-08
Payer: MEDICARE

## 2025-07-08 DIAGNOSIS — M17.11 UNILATERAL PRIMARY OSTEOARTHRITIS, RIGHT KNEE: ICD-10-CM

## 2025-07-08 PROCEDURE — 97110 THERAPEUTIC EXERCISES: CPT | Mod: GP | Performed by: PHYSICAL THERAPIST

## 2025-07-08 NOTE — PROGRESS NOTES
"Physical Therapy    Physical Therapy Treatment    Patient Name: Mary Kate Short  MRN: [id number]  Today's Date: 7/8/2025  Time Entry:   Time Calculation  Start Time: 0925  Stop Time: 1010  Time Calculation (min): 45 min  Visit: 16  Insurance: Reviewed  Physician: Jeannie Reich PA-C    Assessment:  Patient seen in PT for 1 visits for rehab folllowing TKA.  Patient with good progress towards PT goals including:  knee pain to 3/10, improved LEFS to 95%, improved knee ROM to 0-110, improved knee strength at quad to 5/5, hamstring 4+/5, patient doing steps reciprocal with function, Patient with goals partially met - some recent increase of knee pain after fall.  Improved knee ROM but not reach goal of 115 degrees, and improved strength but still slight hamstring weakness to 4+/5.  Dsicahrge to HEP.      Plan:  Discharge to HEP   OP PT Plan  PT Plan: Skilled PT  PT Frequency: 2 times per week  Duration: 6  Onset Date: 03/01/25  Certification Period Start Date: 03/24/25  Certification Period End Date: 06/22/25  Rehab Potential: Good  Plan of Care Agreement: Patient    Current Problem  1. Unilateral primary osteoarthritis, right knee  Follow Up In Physical Therapy          General     Subjective   Knee pain 3/10 at worst last 2 days  Pain at night  throbbing       Precautions: TKA precautions. HTN     Objective      Right knee quad strength 5/5, hamstring 4+/5  Min/mod swelling right knee - increased temp right knee  Normal gait   Steps reciprocal with good form  LEFS=93.75%      Consistently doing dteps reciprocal   Normal gait   LEFS= 95%    Treatments:  SciFit stepper x 10 minutes   Step stretch 20X  Step up 6\" 20X   Steamboats on blue foam 10X   Leg press #6  2 X 20   Knee extensions 15# x 20  Heel slide with strap 2 X 20  SAQ with 2.5# 20X, and SLR 2.5# 10X   PT flexion/extension with OP 15 x 5 sec each    (40 minutes)    CP to the right knee 10 minutes    Not today  Knee extension over bolster 20X  Hip abduction x 20 " "GT March walk in // bars with pause in SLS x 5 D/B  Up and down 6\" steps reciprocally X 5 -    CP to the right knee 10 minutes)    OP EDUCATION: HEP     Goals:  Active       PT Problem       PT Goal 1       Start:  03/24/25    Expected End:  06/22/25       Knee pain 1/10 or less         PT Goal 2       Start:  03/24/25    Expected End:  06/22/25       Improve LEFS by 15%         PT Goal 3       Start:  03/24/25    Expected End:  06/22/25       Normal gait without device  Steps reciprocal with function         PT Goal 4       Start:  03/24/25    Expected End:  06/22/25       0-115 knee ROM          PT Goal 5       Start:  03/24/25    Expected End:  06/22/25       5/5 knee strength                        "

## 2025-08-17 ENCOUNTER — PATIENT MESSAGE (OUTPATIENT)
Dept: PRIMARY CARE | Facility: CLINIC | Age: 65
End: 2025-08-17
Payer: COMMERCIAL

## 2025-08-21 ENCOUNTER — HOSPITAL ENCOUNTER (OUTPATIENT)
Dept: RADIOLOGY | Facility: CLINIC | Age: 65
End: 2025-08-21
Payer: MEDICARE

## 2025-08-21 ENCOUNTER — HOSPITAL ENCOUNTER (OUTPATIENT)
Dept: RADIOLOGY | Facility: CLINIC | Age: 65
Discharge: HOME | End: 2025-08-21
Payer: MEDICARE

## 2025-08-21 ENCOUNTER — OFFICE VISIT (OUTPATIENT)
Dept: ORTHOPEDIC SURGERY | Facility: CLINIC | Age: 65
End: 2025-08-21
Payer: MEDICARE

## 2025-08-21 DIAGNOSIS — M15.9 OSTEOARTHRITIS OF MULTIPLE JOINTS, UNSPECIFIED OSTEOARTHRITIS TYPE: ICD-10-CM

## 2025-08-21 DIAGNOSIS — Z96.651 TOTAL KNEE REPLACEMENT STATUS, RIGHT: Primary | ICD-10-CM

## 2025-08-21 DIAGNOSIS — Z96.651 TOTAL KNEE REPLACEMENT STATUS, RIGHT: ICD-10-CM

## 2025-08-21 DIAGNOSIS — M17.11 PRIMARY OSTEOARTHRITIS OF RIGHT KNEE: ICD-10-CM

## 2025-08-21 PROCEDURE — 73523 X-RAY EXAM HIPS BI 5/> VIEWS: CPT

## 2025-08-21 PROCEDURE — 99213 OFFICE O/P EST LOW 20 MIN: CPT | Performed by: ORTHOPAEDIC SURGERY

## 2025-08-21 PROCEDURE — 99212 OFFICE O/P EST SF 10 MIN: CPT | Mod: 25

## 2025-08-21 PROCEDURE — 73564 X-RAY EXAM KNEE 4 OR MORE: CPT | Mod: LEFT SIDE | Performed by: RADIOLOGY

## 2025-08-21 PROCEDURE — 73523 X-RAY EXAM HIPS BI 5/> VIEWS: CPT | Mod: BILATERAL PROCEDURE | Performed by: RADIOLOGY

## 2025-08-21 PROCEDURE — 73562 X-RAY EXAM OF KNEE 3: CPT | Mod: RT

## 2025-08-21 PROCEDURE — 73564 X-RAY EXAM KNEE 4 OR MORE: CPT | Mod: LT

## 2025-08-21 PROCEDURE — 73562 X-RAY EXAM OF KNEE 3: CPT | Mod: LEFT SIDE | Performed by: RADIOLOGY

## 2025-08-22 ENCOUNTER — HOSPITAL ENCOUNTER (OUTPATIENT)
Dept: RADIOLOGY | Facility: HOSPITAL | Age: 65
Discharge: HOME | End: 2025-08-22
Payer: MEDICARE

## 2025-08-22 ENCOUNTER — OFFICE VISIT (OUTPATIENT)
Dept: PRIMARY CARE | Facility: CLINIC | Age: 65
End: 2025-08-22
Payer: MEDICARE

## 2025-08-22 ENCOUNTER — TELEPHONE (OUTPATIENT)
Dept: PRIMARY CARE | Facility: CLINIC | Age: 65
End: 2025-08-22

## 2025-08-22 VITALS
DIASTOLIC BLOOD PRESSURE: 74 MMHG | WEIGHT: 168 LBS | OXYGEN SATURATION: 97 % | BODY MASS INDEX: 28.68 KG/M2 | HEART RATE: 70 BPM | HEIGHT: 64 IN | SYSTOLIC BLOOD PRESSURE: 134 MMHG
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
## 2025-08-22 DIAGNOSIS — S13.4XXS WHIPLASH INJURIES, SEQUELA: ICD-10-CM

## 2025-08-22 DIAGNOSIS — R20.2 NUMBNESS AND TINGLING: ICD-10-CM

## 2025-08-22 DIAGNOSIS — M25.511 ACUTE PAIN OF BOTH SHOULDERS: ICD-10-CM

## 2025-08-22 DIAGNOSIS — R20.0 NUMBNESS AND TINGLING: ICD-10-CM

## 2025-08-22 DIAGNOSIS — M54.16 LUMBAR RADICULOPATHY: ICD-10-CM

## 2025-08-22 DIAGNOSIS — M25.532 LEFT WRIST PAIN: ICD-10-CM

## 2025-08-22 DIAGNOSIS — M62.838 MUSCLE SPASM: ICD-10-CM

## 2025-08-22 DIAGNOSIS — M25.512 ACUTE PAIN OF BOTH SHOULDERS: ICD-10-CM
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
## 2025-08-22 PROCEDURE — 73030 X-RAY EXAM OF SHOULDER: CPT | Mod: 50

## 2025-08-22 PROCEDURE — 73110 X-RAY EXAM OF WRIST: CPT | Mod: LT

## 2025-08-22 PROCEDURE — 72050 X-RAY EXAM NECK SPINE 4/5VWS: CPT

## 2025-08-22 RX ORDER — CYCLOBENZAPRINE HCL 5 MG
5 TABLET ORAL NIGHTLY PRN
Qty: 30 TABLET | Refills: 0 | Status: SHIPPED | OUTPATIENT
Start: 2025-08-22 | End: 2025-10-21

## 2025-08-22 RX ORDER — METHYLPREDNISOLONE 4 MG/1
TABLET ORAL
Qty: 21 TABLET | Refills: 0 | Status: SHIPPED | OUTPATIENT
Start: 2025-08-22 | End: 2025-08-28

## 2025-08-22 ASSESSMENT — ENCOUNTER SYMPTOMS
DEPRESSION: 0
OCCASIONAL FEELINGS OF UNSTEADINESS: 0
LOSS OF SENSATION IN FEET: 0

## 2025-08-22 ASSESSMENT — PATIENT HEALTH QUESTIONNAIRE - PHQ9
1. LITTLE INTEREST OR PLEASURE IN DOING THINGS: NOT AT ALL
2. FEELING DOWN, DEPRESSED OR HOPELESS: NOT AT ALL
SUM OF ALL RESPONSES TO PHQ9 QUESTIONS 1 AND 2: 0
SUM OF ALL RESPONSES TO PHQ9 QUESTIONS 1 AND 2: 0
1. LITTLE INTEREST OR PLEASURE IN DOING THINGS: NOT AT ALL
2. FEELING DOWN, DEPRESSED OR HOPELESS: NOT AT ALL

## 2025-09-04 ENCOUNTER — OFFICE VISIT (OUTPATIENT)
Dept: ORTHOPEDIC SURGERY | Facility: CLINIC | Age: 65
End: 2025-09-04
Payer: MEDICARE

## 2025-09-04 VITALS — WEIGHT: 168 LBS | HEIGHT: 64 IN | BODY MASS INDEX: 28.68 KG/M2

## 2025-09-04 DIAGNOSIS — M19.049 CMC ARTHRITIS: ICD-10-CM

## 2025-09-04 PROCEDURE — 99213 OFFICE O/P EST LOW 20 MIN: CPT | Performed by: PHYSICIAN ASSISTANT

## 2025-09-04 PROCEDURE — 1036F TOBACCO NON-USER: CPT | Performed by: PHYSICIAN ASSISTANT

## 2025-09-04 PROCEDURE — 3008F BODY MASS INDEX DOCD: CPT | Performed by: PHYSICIAN ASSISTANT

## 2025-09-04 PROCEDURE — 99213 OFFICE O/P EST LOW 20 MIN: CPT

## 2025-09-04 PROCEDURE — 1159F MED LIST DOCD IN RCRD: CPT | Performed by: PHYSICIAN ASSISTANT

## 2025-09-11 ENCOUNTER — APPOINTMENT (OUTPATIENT)
Dept: ORTHOPEDIC SURGERY | Facility: CLINIC | Age: 65
End: 2025-09-11
Payer: MEDICARE

## 2025-09-29 ENCOUNTER — APPOINTMENT (OUTPATIENT)
Dept: PRIMARY CARE | Facility: CLINIC | Age: 65
End: 2025-09-29
Payer: COMMERCIAL

## 2025-11-04 ENCOUNTER — APPOINTMENT (OUTPATIENT)
Dept: DERMATOLOGY | Facility: CLINIC | Age: 65
End: 2025-11-04
Payer: COMMERCIAL

## (undated) DEVICE — GLOVE, SURGICAL, PROTEXIS PI BLUE W/NEUTHERA, 7.0, PF, LF

## (undated) DEVICE — HOOD, SURGICAL, FLYTE HYBRID

## (undated) DEVICE — GLOVE, SURGICAL, PROTEXIS PI , 6.5, PF, LF

## (undated) DEVICE — NEEDLE, SPINAL, QUINCKE, 18 G X 3.5 IN, PINK HUB

## (undated) DEVICE — SUTURE, VICRYL, 0, 18 IN, CT-1, UNDYED

## (undated) DEVICE — CEMENT, MIXEVAC III, 10S BOWL, KNEES

## (undated) DEVICE — ADHESIVE, SKIN, DERMABOND ADVANCED, 15CM, PEN-STYLE

## (undated) DEVICE — SOLUTION, INJECTION, 0.9% SODIUM CHL, USP LIFECARE 1000 MI

## (undated) DEVICE — SYRINGE, 60 CC, IRRIGATION, BULB, CONTRO-BULB, PAPER POUCH

## (undated) DEVICE — BANDAGE, COFLEX, 6 X 5 YDS, TAN, STERILE, LF

## (undated) DEVICE — Device

## (undated) DEVICE — SUTURE, MONOCRYL, 3-0, 27 IN, PS-2, UNDYED

## (undated) DEVICE — SUTURE, VICRYL, 2-0, 18 IN CP-2, UNDYED

## (undated) DEVICE — GLOVE, SURGICAL, PROTEXIS PI , 8.0, PF, LF

## (undated) DEVICE — DRAIN, WOUND, ROUND, W/TROCAR, HOLE PATTERN, 10 IN, MEDIUM/LARGE, 3/16 X 49 IN

## (undated) DEVICE — BLADE, SAW, DUAL SAGITTAL, 1.9 X 90 X 30

## (undated) DEVICE — STRIP, SKIN CLOSURE, STERI STRIP, REINFORCED, 0.5 X 4 IN

## (undated) DEVICE — DRESSING, MEPILEX, BORDER LIGHT, 3 X 3 IN

## (undated) DEVICE — CHANNEL DRAIN, BARD, 15FR, ROUND HUBLESS, FULL FLUTED

## (undated) DEVICE — GLOVE, SURGICAL, PROTEXIS PI , 7.5, PF, LF

## (undated) DEVICE — SYRINGE, 50 CC, LUER LOCK

## (undated) DEVICE — SUTURE, ETHIBOND, P2, V-37, 30 IN, GREEN

## (undated) DEVICE — DRESSING, MEPILEX BORDER, POST-OP AG, 4 X 12 IN

## (undated) DEVICE — CUFF, TOURNIQUET, 30 X 4, DUAL PORT/SNGL BLADDER, DISP, LF

## (undated) DEVICE — DRAPE, SHEET, U, W/ADHESIVE STRIP, IMPERVIOUS, 60 X 70 IN, DISPOSABLE, LF, STERILE

## (undated) DEVICE — BLANKET, LOWER BODY, VHA PLUS, ADULT

## (undated) DEVICE — TUBING, IRRIGATION, HIGH FLOW, HAND PIECE SET

## (undated) DEVICE — DRAPE, IRRIGATION, W/POUCH, ADHESIVE STRIP, STERI DRAPE, 19 X 23 IN, DISPOSABLE, STERILE

## (undated) DEVICE — DRAPE, ISOLATION, INCISE, W/POUCH, STERI DRAPE, 125 X 83 IN, DISPOSABLE, STERILE

## (undated) DEVICE — 10IN STRYKER SMOKE EVACUATION TUBING